# Patient Record
Sex: FEMALE | NOT HISPANIC OR LATINO | Employment: UNEMPLOYED | URBAN - METROPOLITAN AREA
[De-identification: names, ages, dates, MRNs, and addresses within clinical notes are randomized per-mention and may not be internally consistent; named-entity substitution may affect disease eponyms.]

---

## 2023-10-30 ENCOUNTER — OFFICE VISIT (OUTPATIENT)
Dept: FAMILY MEDICINE | Facility: CLINIC | Age: 28
End: 2023-10-30
Payer: COMMERCIAL

## 2023-10-30 ENCOUNTER — HOSPITAL ENCOUNTER (EMERGENCY)
Facility: HOSPITAL | Age: 28
Discharge: HOME | End: 2023-10-30
Admitting: PHYSICIAN ASSISTANT
Payer: COMMERCIAL

## 2023-10-30 VITALS
WEIGHT: 180 LBS | HEIGHT: 64 IN | OXYGEN SATURATION: 99 % | BODY MASS INDEX: 30.73 KG/M2 | TEMPERATURE: 99.7 F | DIASTOLIC BLOOD PRESSURE: 90 MMHG | RESPIRATION RATE: 20 BRPM | HEART RATE: 120 BPM | SYSTOLIC BLOOD PRESSURE: 162 MMHG

## 2023-10-30 VITALS
DIASTOLIC BLOOD PRESSURE: 90 MMHG | HEART RATE: 108 BPM | TEMPERATURE: 100.5 F | WEIGHT: 180 LBS | RESPIRATION RATE: 20 BRPM | OXYGEN SATURATION: 95 % | SYSTOLIC BLOOD PRESSURE: 135 MMHG

## 2023-10-30 DIAGNOSIS — J36 PERITONSILLAR ABSCESS: Primary | ICD-10-CM

## 2023-10-30 DIAGNOSIS — R07.0 THROAT PAIN: ICD-10-CM

## 2023-10-30 LAB
ANION GAP SERPL CALCULATED.3IONS-SCNC: 12 MMOL/L (ref 7–15)
BUN SERPL-MCNC: 6.5 MG/DL (ref 6–20)
CALCIUM SERPL-MCNC: 9.4 MG/DL (ref 8.6–10)
CHLORIDE SERPL-SCNC: 101 MMOL/L (ref 98–107)
CREAT SERPL-MCNC: 0.44 MG/DL (ref 0.51–0.95)
CRP SERPL-MCNC: 99.2 MG/L
DEPRECATED HCO3 PLAS-SCNC: 26 MMOL/L (ref 22–29)
EGFRCR SERPLBLD CKD-EPI 2021: >90 ML/MIN/1.73M2
ERYTHROCYTE [DISTWIDTH] IN BLOOD BY AUTOMATED COUNT: 11.9 % (ref 10–15)
GLUCOSE SERPL-MCNC: 161 MG/DL (ref 70–99)
HCT VFR BLD AUTO: 47 % (ref 35–47)
HGB BLD-MCNC: 15.6 G/DL (ref 11.7–15.7)
MCH RBC QN AUTO: 30.8 PG (ref 26.5–33)
MCHC RBC AUTO-ENTMCNC: 33.2 G/DL (ref 31.5–36.5)
MCV RBC AUTO: 93 FL (ref 78–100)
PLATELET # BLD AUTO: 331 10E3/UL (ref 150–450)
POTASSIUM SERPL-SCNC: 3.8 MMOL/L (ref 3.4–5.3)
RBC # BLD AUTO: 5.07 10E6/UL (ref 3.8–5.2)
SODIUM SERPL-SCNC: 139 MMOL/L (ref 135–145)
WBC # BLD AUTO: 16.9 10E3/UL (ref 4–11)

## 2023-10-30 PROCEDURE — 250N000011 HC RX IP 250 OP 636: Mod: JZ | Performed by: PHYSICIAN ASSISTANT

## 2023-10-30 PROCEDURE — 80048 BASIC METABOLIC PNL TOTAL CA: CPT | Performed by: PHYSICIAN ASSISTANT

## 2023-10-30 PROCEDURE — 96361 HYDRATE IV INFUSION ADD-ON: CPT | Mod: XU

## 2023-10-30 PROCEDURE — 85027 COMPLETE CBC AUTOMATED: CPT | Performed by: PHYSICIAN ASSISTANT

## 2023-10-30 PROCEDURE — 86140 C-REACTIVE PROTEIN: CPT | Performed by: PHYSICIAN ASSISTANT

## 2023-10-30 PROCEDURE — 258N000003 HC RX IP 258 OP 636: Performed by: PHYSICIAN ASSISTANT

## 2023-10-30 PROCEDURE — 99284 EMERGENCY DEPT VISIT MOD MDM: CPT | Mod: 25

## 2023-10-30 PROCEDURE — 96375 TX/PRO/DX INJ NEW DRUG ADDON: CPT | Mod: XU

## 2023-10-30 PROCEDURE — 99203 OFFICE O/P NEW LOW 30 MIN: CPT

## 2023-10-30 PROCEDURE — 42700 I&D ABSCESS PERITONSILLAR: CPT

## 2023-10-30 PROCEDURE — 36415 COLL VENOUS BLD VENIPUNCTURE: CPT | Performed by: PHYSICIAN ASSISTANT

## 2023-10-30 PROCEDURE — 250N000009 HC RX 250: Performed by: PHYSICIAN ASSISTANT

## 2023-10-30 PROCEDURE — 96374 THER/PROPH/DIAG INJ IV PUSH: CPT | Mod: XU

## 2023-10-30 RX ORDER — KETOROLAC TROMETHAMINE 15 MG/ML
15 INJECTION, SOLUTION INTRAMUSCULAR; INTRAVENOUS ONCE
Status: COMPLETED | OUTPATIENT
Start: 2023-10-30 | End: 2023-10-30

## 2023-10-30 RX ORDER — DEXAMETHASONE SODIUM PHOSPHATE 10 MG/ML
10 INJECTION, SOLUTION INTRAMUSCULAR; INTRAVENOUS ONCE
Status: COMPLETED | OUTPATIENT
Start: 2023-10-30 | End: 2023-10-30

## 2023-10-30 RX ORDER — ONDANSETRON 2 MG/ML
4 INJECTION INTRAMUSCULAR; INTRAVENOUS ONCE
Status: COMPLETED | OUTPATIENT
Start: 2023-10-30 | End: 2023-10-30

## 2023-10-30 RX ADMIN — KETOROLAC TROMETHAMINE 15 MG: 15 INJECTION, SOLUTION INTRAMUSCULAR; INTRAVENOUS at 17:24

## 2023-10-30 RX ADMIN — ONDANSETRON 4 MG: 2 INJECTION INTRAMUSCULAR; INTRAVENOUS at 17:24

## 2023-10-30 RX ADMIN — TOPICAL ANESTHETIC 2.5 ML: 200 SPRAY DENTAL; PERIODONTAL at 19:08

## 2023-10-30 RX ADMIN — DEXAMETHASONE SODIUM PHOSPHATE 10 MG: 10 INJECTION, SOLUTION INTRAMUSCULAR; INTRAVENOUS at 17:25

## 2023-10-30 RX ADMIN — SODIUM CHLORIDE 1000 ML: 9 INJECTION, SOLUTION INTRAVENOUS at 17:27

## 2023-10-30 ASSESSMENT — ACTIVITIES OF DAILY LIVING (ADL)
ADLS_ACUITY_SCORE: 35
ADLS_ACUITY_SCORE: 35

## 2023-10-30 NOTE — ED PROVIDER NOTES
EMERGENCY DEPARTMENT ENCOUNTER      NAME: Radha Castanon  AGE: 28 year old female  YOB: 1995  MRN: [id number]  EVALUATION DATE & TIME: 10/30/2023  4:38 PM    PCP: No Ref-Primary, Physician    ED PROVIDER: Anne Flores PA-C      Chief Complaint   Patient presents with    Throat Pain     Left side abscess           FINAL IMPRESSION:  1. Peritonsillar abscess    2. Throat pain          ED COURSE & MEDICAL DECISION MAKIN:45 PM I introduced myself to patient, performed initial HPI and examination.   6:08 PM I talked to the patient about the procedure that will be performed on them.   6:25 PM I performed an incision and drainage procedure on the patient.   7:04 PM I rechecked on the patient and discussed lab and imaging results. Discussed further plan of care and discharge.     28 year old female with no pertinent PMH presents to the Emergency Department for evaluation of left throat pain x 3 days. Reports pain and fullness on the left side, making it difficult to swallow. Fevers today. No history of peritonsillar abscesses previously. No mouth/dental pain. Denies pregnancy.     Differential includes peritonsillar abscess, strep/viral pharyngitis, dental abscess/infection, and others.     Upon arrival: Patient is tachycardic with , Temp 99.7F without antipyretics. HR normalized (97-99) after toradol and IV fluids.   Exam with muffled voice but tolerating secretions, normal phonation, no trismus. Left tonsillar fullness and erythema with deviation of the uvula to the right. Full ROM neck.     Clinically presentation most consistent with peritonsillar abscess. I considered imaging (CT neck soft tissue) but with clinical appearance, tolerating secretions and no respiratory distress I do not think this would change course of treatment as I intend to perform I&D regardless.   Labs obtained primarily for trending, does have leukocytosis (WBC 16.9, BMP unremarkable. CRP markedly elevated (99.20)). I  considered strep testing, however would treat with antibiotics regardless for PTA and thus this would not change clinical course.     Patient was given IV fluids, toradol, and decadron with zofran in the emergency department.    Aspiration was performed, 2.5 mL of purulent drainage removed and patient feels improved after medications and procedures.     Presentation is most consistent with peritonsillar abscess. Will discharge with antibiotics, referral for ENT placed for close follow up ideally in the next few days. Discussed follow up with PCP if ENT cannot see her this week, and given very close ED return precautions. All questions were answered to the best of my ability and patient is agreeable with plan.         Medical Decision Making    History:  Supplemental history from: Documented in chart, if applicable  External Record(s) reviewed: Documented in chart, if applicable.    Work Up:  Chart documentation includes differential considered and any EKGs or imaging independently interpreted by provider.  In additional to work up documented, I considered the following work up: Documented in chart, if applicable.    External consultation:  Discussion of management with another provider: Documented in chart, if applicable    Complicating factors:  Care impacted by chronic illness: N/A  Care affected by social determinants of health: N/A    Disposition considerations: Discharge. I prescribed additional prescription strength medication(s) as charted. See documentation for any additional details.      MEDICATIONS GIVEN IN THE EMERGENCY:  Medications   sodium chloride 0.9% BOLUS 1,000 mL (0 mLs Intravenous Stopped 10/30/23 1903)   ketorolac (TORADOL) injection 15 mg (15 mg Intravenous $Given 10/30/23 1724)   dexAMETHasone (PF) (DECADRON) injectable solution used ORALLY 10 mg (10 mg Oral $Given 10/30/23 1725)   ondansetron (ZOFRAN) injection 4 mg (4 mg Intravenous $Given 10/30/23 1724)   benzocaine 20% (HURRICAINE/TOPEX)  "20 % spray 2.5 mL (2.5 mLs Mouth/Throat $Given by Other 10/30/23 1908)       NEW PRESCRIPTIONS STARTED AT TODAY'S ER VISIT  Discharge Medication List as of 10/30/2023  7:03 PM        START taking these medications    Details   amoxicillin-clavulanate (AUGMENTIN) 875-125 MG tablet Take 1 tablet by mouth 2 times daily for 7 days, Disp-14 tablet, R-0, Local Print                =================================================================    HPI    Patient information was obtained from: Patient and nurse    Use of : N/A        Radha Castanon is a 28 year old female with no pertinent history who presents to this ED by walk in for evaluation of throat pain.     Per nurse reports that the patient began to have left-sided throat pain on Wednesday night and it progressively got worse.     The patient reports that she is having difficulty to swallow and used ibuprofen and Nyquil for her pain yesterday night. She also spiked a fever and has never had any symptoms like this before.     REVIEW OF SYSTEMS   ROS negative unless otherwise stated in HPI    PAST MEDICAL HISTORY:  No past medical history on file.    PAST SURGICAL HISTORY:  No past surgical history on file.    CURRENT MEDICATIONS:    amoxicillin-clavulanate (AUGMENTIN) 875-125 MG tablet        ALLERGIES:  No Known Allergies    FAMILY HISTORY:  No family history on file.    SOCIAL HISTORY:   Social History     Socioeconomic History    Marital status: Patient Declined   Tobacco Use    Smoking status: Never    Smokeless tobacco: Never       VITALS:  BP (!) 162/90   Pulse 120   Temp 99.7  F (37.6  C) (Oral)   Resp 20   Ht 1.626 m (5' 4\")   Wt 81.6 kg (180 lb)   LMP 10/09/2023 (Exact Date)   SpO2 99%   BMI 30.90 kg/m      PHYSICAL EXAM    Constitutional: Well developed, Well nourished, NAD, GCS 15. Muffled voice, tolerating secretions. No tripod-ing.   HENT: Normocephalic, Atraumatic. No trismus. Left tonsillar fullness and erythema with uvular deviation " to the right, right tonsil WNL.   Neck- Supple, Nontender. Normal ROM. No stridor.   Eyes: Conjunctiva normal.   Respiratory: No respiratory distress, speaking in full sentences.   Cardiovascular: Normal heart rate, Regular rhythm, No murmurs.    GI: Soft, nontender.    Musculoskeletal: No deformities, Moves all extremities equally.   Integument: Warm, Dry, No erythema, ecchymosis, or rash.  Neurologic: Alert & oriented x 3, Normal sensory function. No focal deficits.   Psychiatric: Affect normal, Judgment normal, Mood normal. Cooperative.      LAB:  All pertinent labs reviewed and interpreted.  Results for orders placed or performed during the hospital encounter of 10/30/23   CBC (+ platelets, no diff)   Result Value Ref Range    WBC Count 16.9 (H) 4.0 - 11.0 10e3/uL    RBC Count 5.07 3.80 - 5.20 10e6/uL    Hemoglobin 15.6 11.7 - 15.7 g/dL    Hematocrit 47.0 35.0 - 47.0 %    MCV 93 78 - 100 fL    MCH 30.8 26.5 - 33.0 pg    MCHC 33.2 31.5 - 36.5 g/dL    RDW 11.9 10.0 - 15.0 %    Platelet Count 331 150 - 450 10e3/uL   Basic metabolic panel   Result Value Ref Range    Sodium 139 135 - 145 mmol/L    Potassium 3.8 3.4 - 5.3 mmol/L    Chloride 101 98 - 107 mmol/L    Carbon Dioxide (CO2) 26 22 - 29 mmol/L    Anion Gap 12 7 - 15 mmol/L    Urea Nitrogen 6.5 6.0 - 20.0 mg/dL    Creatinine 0.44 (L) 0.51 - 0.95 mg/dL    GFR Estimate >90 >60 mL/min/1.73m2    Calcium 9.4 8.6 - 10.0 mg/dL    Glucose 161 (H) 70 - 99 mg/dL   Result Value Ref Range    CRP Inflammation 99.20 (H) <5.00 mg/L       RADIOLOGY:  Reviewed all pertinent imaging. Please see official radiology report.  No orders to display       EKG:    None    PROCEDURES:   PROCEDURE: Incision and Drainage   INDICATIONS: Localized abscess   PROCEDURE PROVIDER: Anne Flores PA-C   SITE: Left tonsil, PTA   MEDICATION: 1% mLs of 1% Lidocaine without epinephrine   NOTE: Hurricaine spray and then local anesthetic was injected subcutaneously with anesthesia effects demonstrated  prior to proceeding.  The area of maximal fluctuance was aspirated with an 18 gauge needle starting medially and extending outward x 2.5 mL drained out   COMPLEXITY: Simple    Simple = single, furuncle, paronychia, superficial  Complex = multiple or abscess requiring probing, loculations, packing placement   COMPLICATIONS: Patient tolerated procedure well, without complication           I, Ioana Watts, am serving as a scribe to document services personally performed by Anne Flores PA-C based on my observation and the provider's statements to me. I, Anne Flores PA-C, attest that Ioana Watts is acting in a scribe capacity, has observed my performance of the services and has documented them in accordance with my direction.    Anne Flores PA-C  Emergency Medicine  [facility] EMERGENCY DEPARTMENT  [address] [place] [id number]  [phone]             Anne Flores PA-C  10/30/23 2321

## 2023-10-30 NOTE — Clinical Note
Radha Castanon was seen and treated in our emergency department on 10/30/2023.  She may return to work on 11/03/2023.       If you have any questions or concerns, please don't hesitate to call.      Anne Flores PA-C

## 2023-10-30 NOTE — PROGRESS NOTES
URGENT CARE  Assessment & Plan   Assessment:   Radha Castanon is a 28 year old female who's clinical presentation today is consistent with:   1. Peritonsillar abscess  Plan:  Patient has a peritonsillar abscess on exam, sending patient to ED for higher level of care. Noted trismus, dysphonia,  and unilateral left sided  edema of peritonsillar region causing uvular deviation   Patient is agreeable and states she can get there.     TIA [facility]      ______________________________________________________________________      Subjective     HPI: Radha Castanon  is a 28 year old  female who presents today for evaluation the following concerns:   Patient  endorses a sore throat today which started 5 days ago   Patient endorses difficulty opening jaw, dysphonia/voice changes  Patient also endorses a new fever, feelings of malaise and chills   Patient states she was at [facility] urgent care today and they did a rapid strep which they said was negative     Review of Systems:  Pertinent review of systems as reflected in HPI, otherwise negative.     Objective    Physical Exam:  Vitals:    10/30/23 1605   BP: (!) 135/90   Pulse: 108   Resp: 20   Temp: (!) 100.5  F (38.1  C)   TempSrc: Oral   SpO2: 95%   Weight: 81.6 kg (180 lb)      General:   alert and oriented, acute distress, ill-appearing, tearful   Vital signs reviewed: fever noted   MOUTH/THROAT: lips, tongue, & oral mucosa appear normal upon inspection                Posterior oropharynx is erythematous with exudate,  Noted unilateral tonsillar edema with left sided edema causing uvular deviation   Dysphonia heard, consistent with a peritonsillar abscess    ______________________________________________________________________    I explained my diagnostic considerations and recommendations to the patient, who voiced understanding and agreement with the treatment plan.   All questions were answered.   We discussed potential side effects, risks  and benefits of any prescribed or recommended therapies, as well as expectations for response to treatments.  Please see AVS for any patient instructions & handouts given.   Patient was advised to contact the Nurse Care Line, their Primary Care provider, Urgent Care, or the Emergency Department if there are new or worsening symptoms, or call 911 for emergencies.

## 2023-10-30 NOTE — ED TRIAGE NOTES
Patient c/o left sided throat pain that started Wednesday night that has progressively gotten worse.  Denies shortness of breath.  Still has tonsils.  Has hoarse voice.      Triage Assessment (Adult)       Row Name 10/30/23 2978          Triage Assessment    Airway WDL WDL        Respiratory WDL    Respiratory WDL WDL        Skin Circulation/Temperature WDL    Skin Circulation/Temperature WDL WDL        Cardiac WDL    Cardiac WDL WDL        Peripheral/Neurovascular WDL    Peripheral Neurovascular WDL WDL        Cognitive/Neuro/Behavioral WDL    Cognitive/Neuro/Behavioral WDL WDL

## 2023-10-31 ENCOUNTER — HOSPITAL ENCOUNTER (INPATIENT)
Facility: HOSPITAL | Age: 28
LOS: 1 days | Discharge: HOME | End: 2023-11-02
Attending: EMERGENCY MEDICINE | Admitting: INTERNAL MEDICINE
Payer: COMMERCIAL

## 2023-10-31 ENCOUNTER — APPOINTMENT (OUTPATIENT)
Dept: CT IMAGING | Facility: HOSPITAL | Age: 28
End: 2023-10-31
Attending: EMERGENCY MEDICINE
Payer: COMMERCIAL

## 2023-10-31 ENCOUNTER — TELEPHONE (OUTPATIENT)
Dept: OTOLARYNGOLOGY | Facility: CLINIC | Age: 28
End: 2023-10-31
Payer: COMMERCIAL

## 2023-10-31 DIAGNOSIS — J36 PERITONSILLAR ABSCESS: ICD-10-CM

## 2023-10-31 LAB
ALBUMIN SERPL BCG-MCNC: 4 G/DL (ref 3.5–5.2)
ALP SERPL-CCNC: 92 U/L (ref 35–104)
ALT SERPL W P-5'-P-CCNC: 37 U/L (ref 0–50)
ANION GAP SERPL CALCULATED.3IONS-SCNC: 15 MMOL/L (ref 7–15)
AST SERPL W P-5'-P-CCNC: 22 U/L (ref 0–45)
BASOPHILS # BLD AUTO: 0 10E3/UL (ref 0–0.2)
BASOPHILS NFR BLD AUTO: 0 %
BILIRUB SERPL-MCNC: 0.2 MG/DL
BUN SERPL-MCNC: 11.7 MG/DL (ref 6–20)
CALCIUM SERPL-MCNC: 8.6 MG/DL (ref 8.6–10)
CHLORIDE SERPL-SCNC: 103 MMOL/L (ref 98–107)
CREAT SERPL-MCNC: 0.57 MG/DL (ref 0.51–0.95)
DEPRECATED HCO3 PLAS-SCNC: 24 MMOL/L (ref 22–29)
EGFRCR SERPLBLD CKD-EPI 2021: >90 ML/MIN/1.73M2
EOSINOPHIL # BLD AUTO: 0 10E3/UL (ref 0–0.7)
EOSINOPHIL NFR BLD AUTO: 0 %
ERYTHROCYTE [DISTWIDTH] IN BLOOD BY AUTOMATED COUNT: 11.8 % (ref 10–15)
GLUCOSE SERPL-MCNC: 270 MG/DL (ref 70–99)
HCG SERPL QL: NEGATIVE
HCT VFR BLD AUTO: 40.8 % (ref 35–47)
HGB BLD-MCNC: 13.3 G/DL (ref 11.7–15.7)
IMM GRANULOCYTES # BLD: 0.1 10E3/UL
IMM GRANULOCYTES NFR BLD: 1 %
LACTATE SERPL-SCNC: 2 MMOL/L (ref 0.7–2)
LYMPHOCYTES # BLD AUTO: 2.8 10E3/UL (ref 0.8–5.3)
LYMPHOCYTES NFR BLD AUTO: 14 %
MCH RBC QN AUTO: 30.4 PG (ref 26.5–33)
MCHC RBC AUTO-ENTMCNC: 32.6 G/DL (ref 31.5–36.5)
MCV RBC AUTO: 93 FL (ref 78–100)
MONOCYTES # BLD AUTO: 1.2 10E3/UL (ref 0–1.3)
MONOCYTES NFR BLD AUTO: 6 %
NEUTROPHILS # BLD AUTO: 15.6 10E3/UL (ref 1.6–8.3)
NEUTROPHILS NFR BLD AUTO: 79 %
NRBC # BLD AUTO: 0 10E3/UL
NRBC BLD AUTO-RTO: 0 /100
PLATELET # BLD AUTO: 379 10E3/UL (ref 150–450)
POTASSIUM SERPL-SCNC: 3.7 MMOL/L (ref 3.4–5.3)
PROT SERPL-MCNC: 8 G/DL (ref 6.4–8.3)
RBC # BLD AUTO: 4.38 10E6/UL (ref 3.8–5.2)
SODIUM SERPL-SCNC: 142 MMOL/L (ref 135–145)
WBC # BLD AUTO: 19.8 10E3/UL (ref 4–11)

## 2023-10-31 PROCEDURE — 250N000011 HC RX IP 250 OP 636: Mod: JZ | Performed by: EMERGENCY MEDICINE

## 2023-10-31 PROCEDURE — 85025 COMPLETE CBC W/AUTO DIFF WBC: CPT | Performed by: EMERGENCY MEDICINE

## 2023-10-31 PROCEDURE — 99285 EMERGENCY DEPT VISIT HI MDM: CPT | Mod: 25

## 2023-10-31 PROCEDURE — 80053 COMPREHEN METABOLIC PANEL: CPT | Performed by: EMERGENCY MEDICINE

## 2023-10-31 PROCEDURE — 70491 CT SOFT TISSUE NECK W/DYE: CPT

## 2023-10-31 PROCEDURE — 96365 THER/PROPH/DIAG IV INF INIT: CPT

## 2023-10-31 PROCEDURE — 96361 HYDRATE IV INFUSION ADD-ON: CPT

## 2023-10-31 PROCEDURE — 36415 COLL VENOUS BLD VENIPUNCTURE: CPT | Performed by: EMERGENCY MEDICINE

## 2023-10-31 PROCEDURE — 96375 TX/PRO/DX INJ NEW DRUG ADDON: CPT

## 2023-10-31 PROCEDURE — 84703 CHORIONIC GONADOTROPIN ASSAY: CPT | Performed by: EMERGENCY MEDICINE

## 2023-10-31 PROCEDURE — 258N000003 HC RX IP 258 OP 636: Performed by: EMERGENCY MEDICINE

## 2023-10-31 PROCEDURE — 83605 ASSAY OF LACTIC ACID: CPT | Performed by: EMERGENCY MEDICINE

## 2023-10-31 RX ORDER — AMPICILLIN AND SULBACTAM 2; 1 G/1; G/1
3 INJECTION, POWDER, FOR SOLUTION INTRAMUSCULAR; INTRAVENOUS ONCE
Status: COMPLETED | OUTPATIENT
Start: 2023-10-31 | End: 2023-11-01

## 2023-10-31 RX ORDER — IOPAMIDOL 755 MG/ML
75 INJECTION, SOLUTION INTRAVASCULAR ONCE
Status: COMPLETED | OUTPATIENT
Start: 2023-10-31 | End: 2023-10-31

## 2023-10-31 RX ORDER — KETOROLAC TROMETHAMINE 15 MG/ML
15 INJECTION, SOLUTION INTRAMUSCULAR; INTRAVENOUS ONCE
Status: COMPLETED | OUTPATIENT
Start: 2023-10-31 | End: 2023-10-31

## 2023-10-31 RX ADMIN — IOPAMIDOL 75 ML: 755 INJECTION, SOLUTION INTRAVENOUS at 23:00

## 2023-10-31 RX ADMIN — AMPICILLIN SODIUM AND SULBACTAM SODIUM 3 G: 2; 1 INJECTION, POWDER, FOR SOLUTION INTRAMUSCULAR; INTRAVENOUS at 23:38

## 2023-10-31 RX ADMIN — SODIUM CHLORIDE 1000 ML: 9 INJECTION, SOLUTION INTRAVENOUS at 21:56

## 2023-10-31 RX ADMIN — KETOROLAC TROMETHAMINE 15 MG: 15 INJECTION, SOLUTION INTRAMUSCULAR; INTRAVENOUS at 22:05

## 2023-10-31 ASSESSMENT — ENCOUNTER SYMPTOMS
ABDOMINAL PAIN: 0
CHILLS: 1
VOMITING: 0
DIARRHEA: 0
NAUSEA: 0
FEVER: 0
SHORTNESS OF BREATH: 0
TROUBLE SWALLOWING: 1
SORE THROAT: 1
RHINORRHEA: 0

## 2023-10-31 ASSESSMENT — ACTIVITIES OF DAILY LIVING (ADL): ADLS_ACUITY_SCORE: 35

## 2023-10-31 NOTE — TELEPHONE ENCOUNTER
[facility] Call Center    Phone Message    May a detailed message be left on voicemail: yes     Reason for Call: Other: Pt called to schedule an appt from referral that was sent for peritonsillar abscess. Sending [facility] TE per protocols     Action Taken: Other: [facility]    Travel Screening: Not Applicable

## 2023-10-31 NOTE — TELEPHONE ENCOUNTER
This writer called patient to discuss a follow up ENT appointment.    Patient was advised that we don't have any open appointments this week for patient to do a follow up in ENT (dorinda Gomez RN). Patient was advised that she should see her primary care provider for any ongoing issues and to follow up with the ENT clinic if symptoms don't improve and that we would work to get her seen at that time.    Patient verbalizes understanding of this plan and is in agreement. Patient has no further questions at this time.    MYKEL SANABRIA LPN on 10/31/2023 at 11:11 AM

## 2023-10-31 NOTE — DISCHARGE INSTRUCTIONS
Use tylenol and ibuprofen as needed for pain.  Make sure you are drinking plenty of fluids to stay hydrated.  Start the antibiotics as soon as you leave the emergency department: Augmentin 2 times daily for 7 days.    I have placed a referral for ENT and would like you to be seen in the next 2 days. If there are delays and they cannot see you this week, follow up with Primary care in 2 days.    Return to the emergency department if you develop new fevers, worsening throat pain, difficulty swallowing/breathing, or any other concerning symptoms. We would be happy to see you.

## 2023-11-01 ENCOUNTER — ANESTHESIA EVENT (OUTPATIENT)
Dept: SURGERY | Facility: HOSPITAL | Age: 28
End: 2023-11-01
Payer: COMMERCIAL

## 2023-11-01 ENCOUNTER — ANESTHESIA (OUTPATIENT)
Dept: SURGERY | Facility: HOSPITAL | Age: 28
End: 2023-11-01
Payer: COMMERCIAL

## 2023-11-01 PROBLEM — K65.1 PERITONEAL ABSCESS (H): Status: ACTIVE | Noted: 2023-11-01

## 2023-11-01 PROBLEM — J36 PERITONSILLAR ABSCESS: Status: ACTIVE | Noted: 2023-11-01

## 2023-11-01 LAB
ALBUMIN SERPL BCG-MCNC: 3.6 G/DL (ref 3.5–5.2)
ALP SERPL-CCNC: 85 U/L (ref 35–104)
ALT SERPL W P-5'-P-CCNC: 32 U/L (ref 0–50)
ANION GAP SERPL CALCULATED.3IONS-SCNC: 12 MMOL/L (ref 7–15)
AST SERPL W P-5'-P-CCNC: 21 U/L (ref 0–45)
BASOPHILS # BLD AUTO: 0 10E3/UL (ref 0–0.2)
BASOPHILS NFR BLD AUTO: 0 %
BILIRUB SERPL-MCNC: 0.2 MG/DL
BUN SERPL-MCNC: 9.7 MG/DL (ref 6–20)
CALCIUM SERPL-MCNC: 8 MG/DL (ref 8.6–10)
CHLORIDE SERPL-SCNC: 106 MMOL/L (ref 98–107)
CREAT SERPL-MCNC: 0.44 MG/DL (ref 0.51–0.95)
DEPRECATED HCO3 PLAS-SCNC: 23 MMOL/L (ref 22–29)
EGFRCR SERPLBLD CKD-EPI 2021: >90 ML/MIN/1.73M2
EOSINOPHIL # BLD AUTO: 0 10E3/UL (ref 0–0.7)
EOSINOPHIL NFR BLD AUTO: 0 %
ERYTHROCYTE [DISTWIDTH] IN BLOOD BY AUTOMATED COUNT: 11.9 % (ref 10–15)
GLUCOSE BLDC GLUCOMTR-MCNC: 216 MG/DL (ref 70–99)
GLUCOSE BLDC GLUCOMTR-MCNC: 224 MG/DL (ref 70–99)
GLUCOSE BLDC GLUCOMTR-MCNC: 244 MG/DL (ref 70–99)
GLUCOSE BLDC GLUCOMTR-MCNC: 283 MG/DL (ref 70–99)
GLUCOSE SERPL-MCNC: 289 MG/DL (ref 70–99)
HCT VFR BLD AUTO: 38.6 % (ref 35–47)
HGB BLD-MCNC: 12.5 G/DL (ref 11.7–15.7)
IMM GRANULOCYTES # BLD: 0.2 10E3/UL
IMM GRANULOCYTES NFR BLD: 1 %
LYMPHOCYTES # BLD AUTO: 1.2 10E3/UL (ref 0.8–5.3)
LYMPHOCYTES NFR BLD AUTO: 9 %
MCH RBC QN AUTO: 30.3 PG (ref 26.5–33)
MCHC RBC AUTO-ENTMCNC: 32.4 G/DL (ref 31.5–36.5)
MCV RBC AUTO: 94 FL (ref 78–100)
MONOCYTES # BLD AUTO: 0.2 10E3/UL (ref 0–1.3)
MONOCYTES NFR BLD AUTO: 1 %
NEUTROPHILS # BLD AUTO: 11.8 10E3/UL (ref 1.6–8.3)
NEUTROPHILS NFR BLD AUTO: 89 %
NRBC # BLD AUTO: 0 10E3/UL
NRBC BLD AUTO-RTO: 0 /100
PLATELET # BLD AUTO: 331 10E3/UL (ref 150–450)
POTASSIUM SERPL-SCNC: 4.2 MMOL/L (ref 3.4–5.3)
PROT SERPL-MCNC: 7.5 G/DL (ref 6.4–8.3)
RBC # BLD AUTO: 4.13 10E6/UL (ref 3.8–5.2)
SODIUM SERPL-SCNC: 141 MMOL/L (ref 135–145)
WBC # BLD AUTO: 13.4 10E3/UL (ref 4–11)

## 2023-11-01 PROCEDURE — 80053 COMPREHEN METABOLIC PANEL: CPT | Performed by: INTERNAL MEDICINE

## 2023-11-01 PROCEDURE — 87075 CULTR BACTERIA EXCEPT BLOOD: CPT | Performed by: OTOLARYNGOLOGY

## 2023-11-01 PROCEDURE — 250N000009 HC RX 250: Performed by: NURSE ANESTHETIST, CERTIFIED REGISTERED

## 2023-11-01 PROCEDURE — 36415 COLL VENOUS BLD VENIPUNCTURE: CPT | Performed by: INTERNAL MEDICINE

## 2023-11-01 PROCEDURE — 99207 PR NO BILLABLE SERVICE THIS VISIT: CPT | Performed by: INTERNAL MEDICINE

## 2023-11-01 PROCEDURE — 999N000141 HC STATISTIC PRE-PROCEDURE NURSING ASSESSMENT: Performed by: OTOLARYNGOLOGY

## 2023-11-01 PROCEDURE — 258N000003 HC RX IP 258 OP 636: Performed by: NURSE ANESTHETIST, CERTIFIED REGISTERED

## 2023-11-01 PROCEDURE — 258N000003 HC RX IP 258 OP 636: Performed by: INTERNAL MEDICINE

## 2023-11-01 PROCEDURE — 250N000009 HC RX 250: Performed by: OTOLARYNGOLOGY

## 2023-11-01 PROCEDURE — 82962 GLUCOSE BLOOD TEST: CPT

## 2023-11-01 PROCEDURE — 96375 TX/PRO/DX INJ NEW DRUG ADDON: CPT

## 2023-11-01 PROCEDURE — 99222 1ST HOSP IP/OBS MODERATE 55: CPT | Performed by: INTERNAL MEDICINE

## 2023-11-01 PROCEDURE — 42700 I&D ABSCESS PERITONSILLAR: CPT | Performed by: OTOLARYNGOLOGY

## 2023-11-01 PROCEDURE — 120N000001 HC R&B MED SURG/OB

## 2023-11-01 PROCEDURE — 99253 IP/OBS CNSLTJ NEW/EST LOW 45: CPT | Mod: 25 | Performed by: OTOLARYNGOLOGY

## 2023-11-01 PROCEDURE — 0C9P3ZZ DRAINAGE OF TONSILS, PERCUTANEOUS APPROACH: ICD-10-PCS | Performed by: OTOLARYNGOLOGY

## 2023-11-01 PROCEDURE — 250N000012 HC RX MED GY IP 250 OP 636 PS 637: Performed by: INTERNAL MEDICINE

## 2023-11-01 PROCEDURE — 250N000011 HC RX IP 250 OP 636: Performed by: INTERNAL MEDICINE

## 2023-11-01 PROCEDURE — 250N000011 HC RX IP 250 OP 636: Mod: JZ | Performed by: NURSE ANESTHETIST, CERTIFIED REGISTERED

## 2023-11-01 PROCEDURE — 360N000075 HC SURGERY LEVEL 2, PER MIN: Performed by: OTOLARYNGOLOGY

## 2023-11-01 PROCEDURE — 250N000011 HC RX IP 250 OP 636: Mod: JZ | Performed by: EMERGENCY MEDICINE

## 2023-11-01 PROCEDURE — 85025 COMPLETE CBC W/AUTO DIFF WBC: CPT | Performed by: INTERNAL MEDICINE

## 2023-11-01 PROCEDURE — 250N000013 HC RX MED GY IP 250 OP 250 PS 637: Performed by: INTERNAL MEDICINE

## 2023-11-01 PROCEDURE — 710N000009 HC RECOVERY PHASE 1, LEVEL 1, PER MIN: Performed by: OTOLARYNGOLOGY

## 2023-11-01 PROCEDURE — 250N000011 HC RX IP 250 OP 636: Performed by: ANESTHESIOLOGY

## 2023-11-01 PROCEDURE — 272N000001 HC OR GENERAL SUPPLY STERILE: Performed by: OTOLARYNGOLOGY

## 2023-11-01 PROCEDURE — 87077 CULTURE AEROBIC IDENTIFY: CPT | Performed by: OTOLARYNGOLOGY

## 2023-11-01 PROCEDURE — 370N000017 HC ANESTHESIA TECHNICAL FEE, PER MIN: Performed by: OTOLARYNGOLOGY

## 2023-11-01 RX ORDER — ONDANSETRON 2 MG/ML
4 INJECTION INTRAMUSCULAR; INTRAVENOUS EVERY 30 MIN PRN
Status: DISCONTINUED | OUTPATIENT
Start: 2023-11-01 | End: 2023-11-01 | Stop reason: HOSPADM

## 2023-11-01 RX ORDER — DEXTROSE MONOHYDRATE 25 G/50ML
25-50 INJECTION, SOLUTION INTRAVENOUS
Status: DISCONTINUED | OUTPATIENT
Start: 2023-11-01 | End: 2023-11-01

## 2023-11-01 RX ORDER — DEXAMETHASONE SODIUM PHOSPHATE 10 MG/ML
10 INJECTION, SOLUTION INTRAMUSCULAR; INTRAVENOUS ONCE
Status: COMPLETED | OUTPATIENT
Start: 2023-11-01 | End: 2023-11-01

## 2023-11-01 RX ORDER — PROPOFOL 10 MG/ML
INJECTION, EMULSION INTRAVENOUS PRN
Status: DISCONTINUED | OUTPATIENT
Start: 2023-11-01 | End: 2023-11-01

## 2023-11-01 RX ORDER — NALOXONE HYDROCHLORIDE 0.4 MG/ML
0.2 INJECTION, SOLUTION INTRAMUSCULAR; INTRAVENOUS; SUBCUTANEOUS
Status: DISCONTINUED | OUTPATIENT
Start: 2023-11-01 | End: 2023-11-02 | Stop reason: HOSPADM

## 2023-11-01 RX ORDER — FENTANYL CITRATE 50 UG/ML
25 INJECTION, SOLUTION INTRAMUSCULAR; INTRAVENOUS EVERY 5 MIN PRN
Status: DISCONTINUED | OUTPATIENT
Start: 2023-11-01 | End: 2023-11-01 | Stop reason: HOSPADM

## 2023-11-01 RX ORDER — DEXTROSE MONOHYDRATE 25 G/50ML
25-50 INJECTION, SOLUTION INTRAVENOUS
Status: DISCONTINUED | OUTPATIENT
Start: 2023-11-01 | End: 2023-11-02 | Stop reason: HOSPADM

## 2023-11-01 RX ORDER — SODIUM CHLORIDE, SODIUM LACTATE, POTASSIUM CHLORIDE, CALCIUM CHLORIDE 600; 310; 30; 20 MG/100ML; MG/100ML; MG/100ML; MG/100ML
INJECTION, SOLUTION INTRAVENOUS CONTINUOUS PRN
Status: DISCONTINUED | OUTPATIENT
Start: 2023-11-01 | End: 2023-11-01

## 2023-11-01 RX ORDER — ACETAMINOPHEN 325 MG/1
650 TABLET ORAL EVERY 4 HOURS PRN
Status: DISCONTINUED | OUTPATIENT
Start: 2023-11-01 | End: 2023-11-02 | Stop reason: HOSPADM

## 2023-11-01 RX ORDER — ONDANSETRON 2 MG/ML
INJECTION INTRAMUSCULAR; INTRAVENOUS PRN
Status: DISCONTINUED | OUTPATIENT
Start: 2023-11-01 | End: 2023-11-01

## 2023-11-01 RX ORDER — NICOTINE POLACRILEX 4 MG
15-30 LOZENGE BUCCAL
Status: DISCONTINUED | OUTPATIENT
Start: 2023-11-01 | End: 2023-11-01

## 2023-11-01 RX ORDER — PROPOFOL 10 MG/ML
INJECTION, EMULSION INTRAVENOUS CONTINUOUS PRN
Status: DISCONTINUED | OUTPATIENT
Start: 2023-11-01 | End: 2023-11-01

## 2023-11-01 RX ORDER — LIDOCAINE HYDROCHLORIDE 10 MG/ML
INJECTION, SOLUTION INFILTRATION; PERINEURAL PRN
Status: DISCONTINUED | OUTPATIENT
Start: 2023-11-01 | End: 2023-11-01

## 2023-11-01 RX ORDER — OXYCODONE HCL 5 MG/5 ML
0.07 SOLUTION, ORAL ORAL EVERY 6 HOURS PRN
Qty: 75 ML | Refills: 0 | Status: SHIPPED | OUTPATIENT
Start: 2023-11-01

## 2023-11-01 RX ORDER — DEXAMETHASONE SODIUM PHOSPHATE 10 MG/ML
INJECTION, SOLUTION INTRAMUSCULAR; INTRAVENOUS PRN
Status: DISCONTINUED | OUTPATIENT
Start: 2023-11-01 | End: 2023-11-01

## 2023-11-01 RX ORDER — NALOXONE HYDROCHLORIDE 0.4 MG/ML
0.4 INJECTION, SOLUTION INTRAMUSCULAR; INTRAVENOUS; SUBCUTANEOUS
Status: DISCONTINUED | OUTPATIENT
Start: 2023-11-01 | End: 2023-11-02 | Stop reason: HOSPADM

## 2023-11-01 RX ORDER — FENTANYL CITRATE 50 UG/ML
INJECTION, SOLUTION INTRAMUSCULAR; INTRAVENOUS PRN
Status: DISCONTINUED | OUTPATIENT
Start: 2023-11-01 | End: 2023-11-01

## 2023-11-01 RX ORDER — LIDOCAINE HYDROCHLORIDE AND EPINEPHRINE 10; 10 MG/ML; UG/ML
INJECTION, SOLUTION INFILTRATION; PERINEURAL PRN
Status: DISCONTINUED | OUTPATIENT
Start: 2023-11-01 | End: 2023-11-01 | Stop reason: HOSPADM

## 2023-11-01 RX ORDER — FENTANYL CITRATE 50 UG/ML
50 INJECTION, SOLUTION INTRAMUSCULAR; INTRAVENOUS EVERY 5 MIN PRN
Status: DISCONTINUED | OUTPATIENT
Start: 2023-11-01 | End: 2023-11-01 | Stop reason: HOSPADM

## 2023-11-01 RX ORDER — LIDOCAINE 40 MG/G
CREAM TOPICAL
Status: DISCONTINUED | OUTPATIENT
Start: 2023-11-01 | End: 2023-11-01 | Stop reason: HOSPADM

## 2023-11-01 RX ORDER — NICOTINE POLACRILEX 4 MG
15-30 LOZENGE BUCCAL
Status: DISCONTINUED | OUTPATIENT
Start: 2023-11-01 | End: 2023-11-02 | Stop reason: HOSPADM

## 2023-11-01 RX ORDER — SODIUM CHLORIDE, SODIUM LACTATE, POTASSIUM CHLORIDE, CALCIUM CHLORIDE 600; 310; 30; 20 MG/100ML; MG/100ML; MG/100ML; MG/100ML
INJECTION, SOLUTION INTRAVENOUS CONTINUOUS
Status: DISCONTINUED | OUTPATIENT
Start: 2023-11-01 | End: 2023-11-01 | Stop reason: HOSPADM

## 2023-11-01 RX ORDER — METHYLPREDNISOLONE 4 MG
TABLET, DOSE PACK ORAL
Qty: 21 TABLET | Refills: 0 | Status: SHIPPED | OUTPATIENT
Start: 2023-11-02 | End: 2023-11-02

## 2023-11-01 RX ORDER — ONDANSETRON 4 MG/1
4 TABLET, ORALLY DISINTEGRATING ORAL EVERY 30 MIN PRN
Status: DISCONTINUED | OUTPATIENT
Start: 2023-11-01 | End: 2023-11-01 | Stop reason: HOSPADM

## 2023-11-01 RX ORDER — HYDROMORPHONE HYDROCHLORIDE 1 MG/ML
0.2 INJECTION, SOLUTION INTRAMUSCULAR; INTRAVENOUS; SUBCUTANEOUS EVERY 5 MIN PRN
Status: DISCONTINUED | OUTPATIENT
Start: 2023-11-01 | End: 2023-11-01 | Stop reason: HOSPADM

## 2023-11-01 RX ORDER — AMPICILLIN AND SULBACTAM 2; 1 G/1; G/1
3 INJECTION, POWDER, FOR SOLUTION INTRAMUSCULAR; INTRAVENOUS EVERY 6 HOURS
Status: DISCONTINUED | OUTPATIENT
Start: 2023-11-01 | End: 2023-11-02 | Stop reason: HOSPADM

## 2023-11-01 RX ORDER — HYDROMORPHONE HYDROCHLORIDE 1 MG/ML
0.4 INJECTION, SOLUTION INTRAMUSCULAR; INTRAVENOUS; SUBCUTANEOUS EVERY 5 MIN PRN
Status: DISCONTINUED | OUTPATIENT
Start: 2023-11-01 | End: 2023-11-01 | Stop reason: HOSPADM

## 2023-11-01 RX ADMIN — DEXTROSE AND SODIUM CHLORIDE: 5; 900 INJECTION, SOLUTION INTRAVENOUS at 01:54

## 2023-11-01 RX ADMIN — DEXAMETHASONE SODIUM PHOSPHATE 10 MG: 10 INJECTION, SOLUTION INTRAMUSCULAR; INTRAVENOUS at 00:12

## 2023-11-01 RX ADMIN — ONDANSETRON 4 MG: 2 INJECTION INTRAMUSCULAR; INTRAVENOUS at 16:41

## 2023-11-01 RX ADMIN — PROPOFOL 175 MCG/KG/MIN: 10 INJECTION, EMULSION INTRAVENOUS at 16:30

## 2023-11-01 RX ADMIN — PROPOFOL 200 MG: 10 INJECTION, EMULSION INTRAVENOUS at 16:29

## 2023-11-01 RX ADMIN — INSULIN ASPART 1 UNITS: 100 INJECTION, SOLUTION INTRAVENOUS; SUBCUTANEOUS at 19:16

## 2023-11-01 RX ADMIN — LIDOCAINE HYDROCHLORIDE 10 MG: 10 INJECTION, SOLUTION INFILTRATION; PERINEURAL at 16:40

## 2023-11-01 RX ADMIN — AMPICILLIN SODIUM AND SULBACTAM SODIUM 3 G: 2; 1 INJECTION, POWDER, FOR SOLUTION INTRAMUSCULAR; INTRAVENOUS at 12:14

## 2023-11-01 RX ADMIN — ACETAMINOPHEN 650 MG: 325 TABLET ORAL at 09:34

## 2023-11-01 RX ADMIN — ROCURONIUM BROMIDE 50 MG: 50 INJECTION, SOLUTION INTRAVENOUS at 16:29

## 2023-11-01 RX ADMIN — SODIUM CHLORIDE, POTASSIUM CHLORIDE, SODIUM LACTATE AND CALCIUM CHLORIDE: 600; 310; 30; 20 INJECTION, SOLUTION INTRAVENOUS at 16:22

## 2023-11-01 RX ADMIN — SUGAMMADEX 200 MG: 100 INJECTION, SOLUTION INTRAVENOUS at 17:08

## 2023-11-01 RX ADMIN — INSULIN ASPART 2 UNITS: 100 INJECTION, SOLUTION INTRAVENOUS; SUBCUTANEOUS at 07:57

## 2023-11-01 RX ADMIN — DEXTROSE AND SODIUM CHLORIDE: 5; 900 INJECTION, SOLUTION INTRAVENOUS at 13:04

## 2023-11-01 RX ADMIN — AMPICILLIN SODIUM AND SULBACTAM SODIUM 3 G: 2; 1 INJECTION, POWDER, FOR SOLUTION INTRAMUSCULAR; INTRAVENOUS at 19:26

## 2023-11-01 RX ADMIN — LIDOCAINE HYDROCHLORIDE 40 MG: 10 INJECTION, SOLUTION INFILTRATION; PERINEURAL at 16:28

## 2023-11-01 RX ADMIN — INSULIN ASPART 1 UNITS: 100 INJECTION, SOLUTION INTRAVENOUS; SUBCUTANEOUS at 11:56

## 2023-11-01 RX ADMIN — INSULIN ASPART 2 UNITS: 100 INJECTION, SOLUTION INTRAVENOUS; SUBCUTANEOUS at 03:37

## 2023-11-01 RX ADMIN — FENTANYL CITRATE 50 MCG: 50 INJECTION, SOLUTION INTRAMUSCULAR; INTRAVENOUS at 17:46

## 2023-11-01 RX ADMIN — AMPICILLIN SODIUM AND SULBACTAM SODIUM 3 G: 2; 1 INJECTION, POWDER, FOR SOLUTION INTRAMUSCULAR; INTRAVENOUS at 05:22

## 2023-11-01 RX ADMIN — DEXAMETHASONE SODIUM PHOSPHATE 10 MG: 10 INJECTION, SOLUTION INTRAMUSCULAR; INTRAVENOUS at 16:40

## 2023-11-01 RX ADMIN — FENTANYL CITRATE 100 MCG: 50 INJECTION INTRAMUSCULAR; INTRAVENOUS at 16:29

## 2023-11-01 RX ADMIN — FENTANYL CITRATE 50 MCG: 50 INJECTION, SOLUTION INTRAMUSCULAR; INTRAVENOUS at 17:36

## 2023-11-01 ASSESSMENT — ACTIVITIES OF DAILY LIVING (ADL)
ADLS_ACUITY_SCORE: 35
ADLS_ACUITY_SCORE: 20
ADLS_ACUITY_SCORE: 35

## 2023-11-01 NOTE — PROGRESS NOTES
Patient seen by Dr. Brown this morning.  Chart reviewed.  Patient denies pain or fever.  Awaiting I&D of the left tonsillar abscess.  Discussed with patient and nurse.  Ordered Tylenol for pain control.  Continue antibiotics and IV fluids

## 2023-11-01 NOTE — DISCHARGE INSTRUCTIONS
"  Medrol dosepack as directed (with food)  Roxicet as directed (take 1/2 dose first time)  Manuka honey 1 teaspoon diluted in warm water 3x daily for 10 days  Soft diet (no foods with sharp edges) for 14 days  It may be helpful to sleep with your head elevated with several pillows for the first 3 nights to help with post operative swelling      MANUKA HONEY FOR POST TONSIL HEALING    Manuka honey is native to [place]     Unlike traditionalhoney. Manuka honey has antibacterial activity    It can reduce pain after tonsillectomy and speeds up wound healing.    1 teaspoon of manuka honey 2-3 times a day can help after tonsil surgery    It is best taken directly from the spoon but if needed you can mix itin plain or lukewarm water. Avoid hot water.    UMF or unique manuka factor rating is a score given to manuka honey based on methylglyoxal content.     Effective manuka honey should have UMF rating of 10 or above.    Alternatively, you can use Manuka \"soothing pops\" available on [employer] or at [employer]   "

## 2023-11-01 NOTE — H&P
"[facility]    History and Physical - Hospitalist Service       Date of Admission:  10/31/2023    Assessment & Plan      Patient is a 28 year old without much medical history who is being admitted wih a peritonlar abscess  that has has  failed outpatient treatment.    Patient Active Problem List   Diagnosis    Peritoneal abscess (H)    Peritonsillar abscess      Peritonsillar abscess -failed outpatient treatment with Augmentin.  Patient started on Unasyn, IV Decadron.  Pain control for now  ENT consulted for further surgical intervention  Make n.p.o.  D5 saline running        Diet:  NPO.  D5 saline  DVT Prophylaxis: Pneumatic Compression Devices  Sierra Catheter: Not present  Lines: None     Cardiac Monitoring: None  Code Status:  Full code    Clinically Significant Risk Factors Present on Admission                       # Obesity: Estimated body mass index is 30.9 kg/m  as calculated from the following:    Height as of 10/30/23: 1.626 m (5' 4\").    Weight as of 10/30/23: 81.6 kg (180 lb).              Disposition Plan           Leela Brown MD  Hospitalist Service  [facility]  Securely message with [employer] (more info)  Text page via [facility] Paging/Directory     ______________________________________________________________________    Chief Complaint   Peritonsillar abscess       History of Present Illness   Patient is a 28 year old without much medical history who is being admitted wih a peritonlar abscess  that has has  failed outpatient treatment.    Patient was seen in the ER on admission.  She was awake, alert, oriented to place person and time and could provide a history.  Per report, She was seen 10/30, drained, sent home on agumentin.       Patient presented with with worsening s swelling and increasing pain. Preliminary work-up done in the ER showed a BMP which was unremarkable.  CRP was 99.2 10/30/2023.  White count worsened from 16-19,000 from " 10/30/2023.    Narrative & Impression   EXAM: CT SOFT TISSUE NECK W CONTRAST  LOCATION: [facility]  DATE: 10/31/2023     INDICATION: left sided likely peritonsillar abscess, drained 2.5 ml yesterday, now with worsening sweling and pain, trismus, eval for increasing abscess, etc, also eval vessels  COMPARISON: None.  CONTRAST: 75 mL Isovue 370  TECHNIQUE: Routine CT Soft Tissue Neck with IV contrast. Multiplanar reformats. Dose reduction techniques were used.     FINDINGS:      MUCOSAL SPACES/SOFT TISSUES: Hyperenhancement of the palatine tonsils with a 2.6 x 3.4 x 3.5 cm abscess on the left. Mild induration of the left parapharyngeal space. This results in mild narrowing of the nasopharyngeal airway. Normal vocal cords and   infraglottic trachea. Normal oral cavity,  spaces, and floor of mouth structures.     LYMPH NODES: Left cervical adenopathy, likely reactive.     SALIVARY GLANDS: Normal parotid and submandibular glands.     THYROID: Normal.      VESSELS: Vascular structures of the neck are patent.     VISUALIZED INTRACRANIAL/ORBITS/SINUSES: No abnormality of the visualized intracranial compartment or orbits. Visualized paranasal sinuses and mastoid air cells are clear.     OTHER: No destructive osseous lesion. The included lung apices are clear.                                                                      IMPRESSION:   1.  Acute tonsillitis with 3.5 cm abscess in the left palatine tonsil.  2.  No significant airway narrowing or floor of mouth involvement.       ER intervention included given patient starting patient on Unasyn and giving IV Decadron.  An attempt was made to reach ENT which was unsuccessful.  She is being admitted for further inpatient cares.  ER intervention included starting patient on Unasyn    Patient graded her pain is 4 out of 10 when seen in the ER.  She did not want anything for pain      Past Medical History    Pharyngitis    Past Surgical  History   Status post I&D 10/30/2023      Prior to Admission Medications   Prior to Admission Medications   Prescriptions Last Dose Informant Patient Reported? Taking?   amoxicillin-clavulanate (AUGMENTIN) 875-125 MG tablet 10/31/2023 at pm Self No Yes   Sig: Take 1 tablet by mouth 2 times daily for 7 days      Facility-Administered Medications: None        Review of Systems    The 10 point Review of Systems is negative other than noted in the HPI or here.     Social History   I have reviewed this patient's social history and updated it with pertinent information if needed.  Social History     Tobacco Use    Smoking status: Never    Smokeless tobacco: Never         Family History           Allergies   No Known Allergies     Physical Exam   Vital Signs: Temp: 98.7  F (37.1  C) Temp src: Temporal BP: 117/67 Pulse: 75   Resp: 16 SpO2: 97 % O2 Device: None (Room air)    Weight: 0 lbs 0 oz      General Aox3, appropriate affect, NAD, on RA  HEENT dry mucous membranes, EOMI, PERRL  Unable to visualize pharynx  Chest Adeq E b/l, No wheezing  Heart RRR, No M/R/G  Abd- Soft, NT, BS+  - Deferred,   Extremity- Moving all extremities, No digital clubbing,   No edema  Neuro- Aox3, CN II- XII intact, No peripheral vision loss  P5/5, T-N, reflexes 2+, plantar reflex downwards,  gait not checked  No skin rash     Medical Decision Making       70min MINUTES SPENT BY ME on the date of service doing chart review, history, exam, documentation & further activities per the note.          Data     I have personally reviewed the following data over the past 24 hrs:    19.8 (H)  \   13.3   / 379     142 103 11.7 /  270 (H)   3.7 24 0.57 \     ALT: 37 AST: 22 AP: 92 TBILI: 0.2   ALB: 4.0 TOT PROTEIN: 8.0 LIPASE: N/A     Procal: N/A CRP: N/A Lactic Acid: 2.0         Imaging results reviewed over the past 24 hrs:   Recent Results (from the past 24 hour(s))   Soft tissue neck CT w contrast    Addendum: 10/31/2023    Dr. Soto was contacted  by me on 10/31/2023 11:25 PM CDT.      Narrative    EXAM: CT SOFT TISSUE NECK W CONTRAST  LOCATION: [facility]  DATE: 10/31/2023    INDICATION: left sided likely peritonsillar abscess, drained 2.5 ml yesterday, now with worsening sweling and pain, trismus, eval for increasing abscess, etc, also eval vessels  COMPARISON: None.  CONTRAST: 75 mL Isovue 370  TECHNIQUE: Routine CT Soft Tissue Neck with IV contrast. Multiplanar reformats. Dose reduction techniques were used.    FINDINGS:     MUCOSAL SPACES/SOFT TISSUES: Hyperenhancement of the palatine tonsils with a 2.6 x 3.4 x 3.5 cm abscess on the left. Mild induration of the left parapharyngeal space. This results in mild narrowing of the nasopharyngeal airway. Normal vocal cords and   infraglottic trachea. Normal oral cavity,  spaces, and floor of mouth structures.    LYMPH NODES: Left cervical adenopathy, likely reactive.    SALIVARY GLANDS: Normal parotid and submandibular glands.    THYROID: Normal.     VESSELS: Vascular structures of the neck are patent.    VISUALIZED INTRACRANIAL/ORBITS/SINUSES: No abnormality of the visualized intracranial compartment or orbits. Visualized paranasal sinuses and mastoid air cells are clear.    OTHER: No destructive osseous lesion. The included lung apices are clear.      Impression    IMPRESSION:   1.  Acute tonsillitis with 3.5 cm abscess in the left palatine tonsil.  2.  No significant airway narrowing or floor of mouth involvement.

## 2023-11-01 NOTE — ANESTHESIA CARE TRANSFER NOTE
Patient: Radha Castanon    Procedure: Procedure(s):  INCISION AND DRAINAGE, ABSCESS, TONSILLAR OR PERITONSILLAR       Diagnosis: Peritonsillar abscess [J36]  Diagnosis Additional Information: No value filed.    Anesthesia Type:   General     Note:    Oropharynx: oropharynx clear of all foreign objects and spontaneously breathing  Level of Consciousness: awake and drowsy  Oxygen Supplementation: face mask  Level of Supplemental Oxygen (L/min / FiO2): 8  Independent Airway: airway patency satisfactory and stable  Dentition: dentition unchanged  Vital Signs Stable: post-procedure vital signs reviewed and stable  Report to RN Given: handoff report given  Patient transferred to: PACU    Handoff Report: Identifed the Patient, Identified the Reponsible Provider, Reviewed the pertinent medical history, Discussed the surgical course, Reviewed Intra-OP anesthesia mangement and issues during anesthesia, Set expectations for post-procedure period and Allowed opportunity for questions and acknowledgement of understanding      Vitals:  Vitals Value Taken Time   /71 11/01/23 1719   Temp 97.2 F 11/01/23  1719   Pulse 68 11/01/23 1720   Resp 17 11/01/23 1720   SpO2 90 % 11/01/23 1720   Vitals shown include unfiled device data.    Electronically Signed By: TIA Macdonald CRNA  November 1, 2023  5:22 PM

## 2023-11-01 NOTE — PLAN OF CARE
Problem: Adult Inpatient Plan of Care  Goal: Optimal Comfort and Wellbeing  Outcome: Progressing  Intervention: Monitor Pain and Promote Comfort  Recent Flowsheet Documentation  Taken 11/1/2023 1227 by Chantel Layton, RN  Pain Management Interventions: declines  Taken 11/1/2023 0934 by Chantel Layton, RN  Pain Management Interventions: medication (see MAR)   Goal Outcome Evaluation:  Patient will be going to surgery to have peritonsillar abscess drained at 4:30 today. Tylenol given for pain and patient states was helpful. VSS. Receiving IV antibiotics. NPO.    Chantel Layton, RN'

## 2023-11-01 NOTE — ANESTHESIA PROCEDURE NOTES
Airway       Patient location during procedure: OR       Procedure Start/Stop Times: 11/1/2023 4:31 PM  Staff -        CRNA: Shane Ladd APRN CRNA       Performed By: CRNA  Consent for Airway        Urgency: elective  Indications and Patient Condition       Indications for airway management: alex-procedural         Mask difficulty assessment: 1 - vent by mask    Final Airway Details       Final airway type: endotracheal airway       Successful airway: ETT - single  Endotracheal Airway Details        ETT size (mm): 7.5       Cuffed: yes       Successful intubation technique: video laryngoscopy       VL Blade Size: Glidescope 4       Grade View of Cords: 1       Adjucts: stylet       Position: Center       Measured from: lips       Secured at (cm): 22       Bite block used: None    Post intubation assessment        Placement verified by: capnometry, equal breath sounds and chest rise        Number of attempts at approach: 1       Number of other approaches attempted: 0       Secured with: silk tape       Ease of procedure: easy       Dentition: Intact and Unchanged    Medication(s) Administered   Medication Administration Time: 11/1/2023 4:31 PM

## 2023-11-01 NOTE — PROVIDER NOTIFICATION
Phoned Dr Spann to see if pt is to discharge today, he wants her to spend the night and monitor to make sure swelling goes down.

## 2023-11-01 NOTE — ANESTHESIA PREPROCEDURE EVALUATION
Anesthesia Pre-Procedure Evaluation    Patient: Radha Castanon   MRN: [id number] : 1995        Procedure : Procedure(s):  INCISION AND DRAINAGE, ABSCESS, TONSILLAR OR PERITONSILLAR          History reviewed. No pertinent past medical history.   History reviewed. No pertinent surgical history.   No Known Allergies   Social History     Tobacco Use    Smoking status: Never    Smokeless tobacco: Never   Substance Use Topics    Alcohol use: Not on file      Wt Readings from Last 1 Encounters:   10/30/23 81.6 kg (180 lb)        Anesthesia Evaluation            ROS/MED HX  ENT/Pulmonary:  - neg pulmonary ROS     Neurologic:  - neg neurologic ROS     Cardiovascular:  - neg cardiovascular ROS     METS/Exercise Tolerance:     Hematologic:  - neg hematologic  ROS     Musculoskeletal:  - neg musculoskeletal ROS     GI/Hepatic:  - neg GI/hepatic ROS     Renal/Genitourinary:  - neg Renal ROS     Endo:     (+)               Obesity,       Psychiatric/Substance Use:  - neg psychiatric ROS     Infectious Disease:  - neg infectious disease ROS     Malignancy:       Other:            Physical Exam    Airway        Mallampati: II   TM distance: > 3 FB   Neck ROM: full   Mouth opening: < 3 cm    Respiratory Devices and Support         Dental           Cardiovascular   cardiovascular exam normal          Pulmonary   pulmonary exam normal                OUTSIDE LABS:  CBC:   Lab Results   Component Value Date    WBC 13.4 (H) 2023    WBC 19.8 (H) 10/31/2023    HGB 12.5 2023    HGB 13.3 10/31/2023    HCT 38.6 2023    HCT 40.8 10/31/2023     2023     10/31/2023     BMP:   Lab Results   Component Value Date     2023     10/31/2023    POTASSIUM 4.2 2023    POTASSIUM 3.7 10/31/2023    CHLORIDE 106 2023    CHLORIDE 103 10/31/2023    CO2 23 2023    CO2 24 10/31/2023    BUN 9.7 2023    BUN 11.7 10/31/2023    CR 0.44 (L) 2023    CR 0.57 10/31/2023      "(H) 11/01/2023     (H) 11/01/2023     COAGS: No results found for: \"PTT\", \"INR\", \"FIBR\"  POC:   Lab Results   Component Value Date    HCGS Negative 10/31/2023     HEPATIC:   Lab Results   Component Value Date    ALBUMIN 3.6 11/01/2023    PROTTOTAL 7.5 11/01/2023    ALT 32 11/01/2023    AST 21 11/01/2023    ALKPHOS 85 11/01/2023    BILITOTAL 0.2 11/01/2023     OTHER:   Lab Results   Component Value Date    LACT 2.0 10/31/2023    MARIJA 8.0 (L) 11/01/2023       Anesthesia Plan    ASA Status:  3, emergent       Anesthesia Type: General.     - Airway: ETT   Induction: Intravenous.   Maintenance: Balanced.   Techniques and Equipment:     - Airway: Video-Laryngoscope       Consents          - Extended Intubation/Ventilatory Support Discussed: No.      - Patient is DNR/DNI Status: No     Use of blood products discussed: No .     Postoperative Care    Pain management: IV analgesics.   PONV prophylaxis: Ondansetron (or other 5HT-3), Dexamethasone or Solumedrol     Comments:                SARAH LINO MD  "

## 2023-11-01 NOTE — INTERVAL H&P NOTE
"I have reviewed the surgical (or preoperative) H&P that is linked to this encounter, and examined the patient. There are no significant changes    Clinical Conditions Present on Arrival:  Clinically Significant Risk Factors Present on Admission           # Hypocalcemia: Lowest Ca = 8 mg/dL in last 2 days, will monitor and replace as appropriate        # Obesity: Estimated body mass index is 30.9 kg/m  as calculated from the following:    Height as of 10/30/23: 1.626 m (5' 4\").    Weight as of 10/30/23: 81.6 kg (180 lb).       "

## 2023-11-01 NOTE — ED TRIAGE NOTES
Pt arrives ambulatory to triage for throat pain. Pt reports the pain began on Wednesday and has began making her left ear hurt. She was seen here yesterday and the area in her throat was drained. Since then the area has began to swell and is still painful. Pt is breathing normally and able to manage secretions.

## 2023-11-01 NOTE — ED PROVIDER NOTES
EMERGENCY DEPARTMENT ENCOUNTER      NAME: Radha Castanon  AGE: 28 year old female  YOB: 1995  MRN: [id number]  EVALUATION DATE & TIME: 10/31/2023  9:07 PM    PCP: Yao Rich    ED PROVIDER: Deb Soto MD      Chief Complaint   Patient presents with    Oral Swelling    Pharyngitis         FINAL IMPRESSION:  1. Peritonsillar abscess          ED COURSE & MEDICAL DECISION MAKING:    Pertinent Labs & Imaging studies reviewed. (See chart for details)    9:17 PM I introduced myself to the patient, obtained patient history, performed a physical exam, and discussed plan for ED workup including potential diagnostic laboratory/imaging studies and interventions.    28 year old female presents to the Emergency Department for evaluation of worsening throat pain and swelling.  Patient was seen yesterday and clinically diagnosed with a left peritonsillar abscess that was drained and reportedly 2.5 mL of purulent fluid was removed by needle aspiration.  She was given 10 mg Decadron yesterday and started on Augmentin which she reports she has been taking.  She states today she noticed that the swelling seemed to worsen and she was having worsening pain and trouble swallowing thus presented here for evaluation.  She was referred to ENT but not yet able to make an appointment to see them.  On exam, she does have a significant left peritonsillar likely abscess with uvular deviation to the right.  She has trismus and her voice is somewhat muffled.  However she is tolerating her secretions without difficulty and has no signs of respiratory distress or stridor.  Uncertain what this appeared like on exam yesterday but she is reporting this is worsening and it is rather significant on my exam.  Thus will obtain CT of the soft tissue neck to evaluate signs for worsening abscess versus potential drainage complication such as bleeding, etc.  She has no signs of sublingual swelling to suggest Trevon's angina at this point.   She is satting normally on room air.  IV access was established and she was given 50 mg of IV Toradol as well as 1000 mg of IV fluids and 10 mg of IV Decadron.  Laboratory studies obtained.      CBC Reveals a white blood cell count of 19.8 which is up from 16.9 yesterday.  Hemoglobin 13.3.  CMP largely unremarkable with normal creatinine.  Lactic acid within normal limits.  Pregnancy test negative.  CT of the neck reveals acute tonsillitis with 3.5 cm abscess in the left palatine tonsil.  No significant airway narrowing or floor of the mouth involvement.  I did attempt to contact Dr. Spann with the ENT but was unsuccessful.  Do feel that with this worsening and reaccumulation after prior drainage that she does warrant admission for IV antibiotics and ENT assessment for possible repeat drainage.  This is worsened in a short period of time and significantly swollen so again do feel that admission for further management is appropriate.  Patient was in agreement with this plan.  Medicine plans to put in an inpatient ENT consult and excepted the patient for admission.  She was given a dose of IV Unasyn here.  She was admitted to the medicine service in stable condition.         9:17 PM I met with the patient, obtained history, performed an initial exam, and discussed options and plan for diagnostics and treatment here in the ED.   1:29 AM I Spoke with Dr. Brown, Hospitalist. We further discussed the patient's case and reviewed ED work-up so far.She agrees to admit the patient.    At the conclusion of the encounter I discussed the results of all of the tests and the disposition. The questions were answered. The patient or family acknowledged understanding and was agreeable with the care plan.       Medical Decision Making    History:  Supplemental history from: Documented in chart, if applicable  External Record(s) reviewed: Documented in chart, if applicable. and Inpatient Record: ED visit on 10/30/2023    Work  Up:  Chart documentation includes differential considered and any EKGs or imaging independently interpreted by provider.  In additional to work up documented, I considered the following work up: Documented in chart, if applicable.    External consultation:  Discussion of management with another provider: Documented in chart, if applicable and Hospitalist    Complicating factors:  Care impacted by chronic illness: N/A  Care affected by social determinants of health: Access to Medical Care    Disposition considerations: Admit      MEDICATIONS GIVEN IN THE EMERGENCY:  Medications   dextrose 5% and 0.9% NaCl infusion ( Intravenous Restarted 11/1/23 0600)   dextrose 50 % injection 25-50 mL (has no administration in time range)     Or   glucagon injection 1 mg (has no administration in time range)   HYDROmorphone (DILAUDID) injection 0.3 mg (has no administration in time range)   ampicillin-sulbactam (UNASYN) 3 g vial to attach to  mL bag (0 g Intravenous Stopped 11/1/23 0600)   glucose gel 15-30 g (has no administration in time range)     Or   dextrose 50 % injection 25-50 mL (has no administration in time range)     Or   glucagon injection 1 mg (has no administration in time range)   insulin aspart (NovoLOG) injection (RAPID ACTING) (2 Units Subcutaneous $Given 11/1/23 0337)   melatonin tablet 1 mg (has no administration in time range)   sodium chloride 0.9% BOLUS 1,000 mL (0 mLs Intravenous Stopped 10/31/23 2337)   ketorolac (TORADOL) injection 15 mg (15 mg Intravenous $Given 10/31/23 2205)   iopamidol (ISOVUE-370) solution 75 mL (75 mLs Intravenous $Given 10/31/23 2300)   ampicillin-sulbactam (UNASYN) 3 g vial to attach to  mL bag (0 g Intravenous Stopped 11/1/23 0007)   dexAMETHasone PF (DECADRON) injection 10 mg (10 mg Intravenous $Given 11/1/23 0012)       NEW PRESCRIPTIONS STARTED AT TODAY'S ER VISIT  New Prescriptions    No medications on file           =================================================================  [facility]    Patient information was obtained from: The patient    Use of : N/A       Radha Castanon is a 28 year old female who reports she is otherwise healthy who presents to this ED for evaluation of oral swelling and pharyngitis.    Per chart review, the patient was seen at [facility] ED on 10/30/2023 for evaluation of throat pain within the last 3 days. Pain was localized to the left side. I&D performed to localized abscess at left tonsil with 2.5 ml drained out. She was discharged with a 7 day course of Augmentin.    The patient reports having a throat abscess drained yesterday in the ER and developed increased swelling, left otalgia, and chills today. She endorses difficulty swallowing secondary to pain. She has been rotating Tylenol and Ibuprofen for pain control. Her last dose was around yesterday evening. The patient was also started on antibiotics yesterday and took her dose today. She denies taking any chronic medication and any other personal medical history.    Otherwise, the patient denied having chest pain, difficulty breathing, cough, nausea, vomiting, diarrhea, rhinorrhea, abdominal pain, and any other medical complaints or concerns at this time.    REVIEW OF SYSTEMS   Review of Systems   Constitutional:  Positive for chills. Negative for fever.   HENT:  Positive for ear pain (left), sore throat (left-side) and trouble swallowing. Negative for rhinorrhea.         Positive for swelling to left throat   Respiratory:  Negative for shortness of breath.    Cardiovascular:  Negative for chest pain.   Gastrointestinal:  Negative for abdominal pain, diarrhea, nausea and vomiting.      Pertinent positives and negatives are documented in the HPI. All other systems reviewed and are negative.      PAST MEDICAL HISTORY:  History reviewed. No pertinent past medical history.    PAST SURGICAL HISTORY:  History reviewed. No pertinent surgical  history.        CURRENT MEDICATIONS:    amoxicillin-clavulanate (AUGMENTIN) 875-125 MG tablet        ALLERGIES:  No Known Allergies    FAMILY HISTORY:  No family history on file.    SOCIAL HISTORY:   Social History     Socioeconomic History    Marital status: Patient Declined   Tobacco Use    Smoking status: Never    Smokeless tobacco: Never       VITALS:  /82   Pulse 68   Temp 98.7  F (37.1  C) (Temporal)   Resp 16   LMP 10/09/2023 (Exact Date)   SpO2 94%     PHYSICAL EXAM    Physical Exam  Constitutional: Well developed, Well nourished, NAD, GCS 15  HENT: Normocephalic, Atraumatic, Bilateral external ears normal, Oropharynx exam shows large peritonsillar swelling on the left with uvular deviation to the right, her voice is somewhat muffled but she is tolerating secretions without difficulty and in no respiratory distress, does have trismus noted, mucous membranes moist, Nose normal. Neck- Normal range of motion, No tenderness, Supple, No stridor.   Eyes: PERRL, EOMI, Conjunctiva normal, No discharge.   Respiratory: Normal breath sounds, No respiratory distress, No wheezing or crackles, Speaks in full sentences easily.    Cardiovascular: Normal heart rate, Regular rhythm,  No murmurs, No rubs, No gallops. 2+ radial pulses bilaterally  GI: Bowel sounds normal, Soft, No tenderness, No masses, No rebound or guarding.   Musculoskeletal: 2+ DP pulses. No notable lower extremity edema. No cyanosis, No clubbing. Good range of motion in all major joints.   Integument: Warm, Dry, No erythema, No rash. No petechiae.   Neurologic: Alert & oriented x 3, 5/5 strength in all 4 extremities bilaterally. Sensation intact to light touch in all 4 extremities and the face bilaterally. No focal deficits noted.   Psychiatric: Affect normal, Judgment normal, Mood normal. Cooperative.      LAB:  All pertinent labs reviewed and interpreted.  Results for orders placed or performed during the hospital encounter of 10/31/23   Soft  tissue neck CT w contrast    Impression    IMPRESSION:   1.  Acute tonsillitis with 3.5 cm abscess in the left palatine tonsil.  2.  No significant airway narrowing or floor of mouth involvement.   Comprehensive metabolic panel   Result Value Ref Range    Sodium 142 135 - 145 mmol/L    Potassium 3.7 3.4 - 5.3 mmol/L    Carbon Dioxide (CO2) 24 22 - 29 mmol/L    Anion Gap 15 7 - 15 mmol/L    Urea Nitrogen 11.7 6.0 - 20.0 mg/dL    Creatinine 0.57 0.51 - 0.95 mg/dL    GFR Estimate >90 >60 mL/min/1.73m2    Calcium 8.6 8.6 - 10.0 mg/dL    Chloride 103 98 - 107 mmol/L    Glucose 270 (H) 70 - 99 mg/dL    Alkaline Phosphatase 92 35 - 104 U/L    AST 22 0 - 45 U/L    ALT 37 0 - 50 U/L    Protein Total 8.0 6.4 - 8.3 g/dL    Albumin 4.0 3.5 - 5.2 g/dL    Bilirubin Total 0.2 <=1.2 mg/dL   Lactic acid whole blood   Result Value Ref Range    Lactic Acid 2.0 0.7 - 2.0 mmol/L   HCG qualitative Blood   Result Value Ref Range    hCG Serum Qualitative Negative Negative   CBC with platelets and differential   Result Value Ref Range    WBC Count 19.8 (H) 4.0 - 11.0 10e3/uL    RBC Count 4.38 3.80 - 5.20 10e6/uL    Hemoglobin 13.3 11.7 - 15.7 g/dL    Hematocrit 40.8 35.0 - 47.0 %    MCV 93 78 - 100 fL    MCH 30.4 26.5 - 33.0 pg    MCHC 32.6 31.5 - 36.5 g/dL    RDW 11.8 10.0 - 15.0 %    Platelet Count 379 150 - 450 10e3/uL    % Neutrophils 79 %    % Lymphocytes 14 %    % Monocytes 6 %    % Eosinophils 0 %    % Basophils 0 %    % Immature Granulocytes 1 %    NRBCs per 100 WBC 0 <1 /100    Absolute Neutrophils 15.6 (H) 1.6 - 8.3 10e3/uL    Absolute Lymphocytes 2.8 0.8 - 5.3 10e3/uL    Absolute Monocytes 1.2 0.0 - 1.3 10e3/uL    Absolute Eosinophils 0.0 0.0 - 0.7 10e3/uL    Absolute Basophils 0.0 0.0 - 0.2 10e3/uL    Absolute Immature Granulocytes 0.1 <=0.4 10e3/uL    Absolute NRBCs 0.0 10e3/uL   Comprehensive metabolic panel   Result Value Ref Range    Sodium 141 135 - 145 mmol/L    Potassium 4.2 3.4 - 5.3 mmol/L    Carbon Dioxide (CO2) 23 22  - 29 mmol/L    Anion Gap 12 7 - 15 mmol/L    Urea Nitrogen 9.7 6.0 - 20.0 mg/dL    Creatinine 0.44 (L) 0.51 - 0.95 mg/dL    GFR Estimate >90 >60 mL/min/1.73m2    Calcium 8.0 (L) 8.6 - 10.0 mg/dL    Chloride 106 98 - 107 mmol/L    Glucose 289 (H) 70 - 99 mg/dL    Alkaline Phosphatase 85 35 - 104 U/L    AST 21 0 - 45 U/L    ALT 32 0 - 50 U/L    Protein Total 7.5 6.4 - 8.3 g/dL    Albumin 3.6 3.5 - 5.2 g/dL    Bilirubin Total 0.2 <=1.2 mg/dL   Glucose by meter   Result Value Ref Range    GLUCOSE BY METER POCT 283 (H) 70 - 99 mg/dL   CBC with platelets and differential   Result Value Ref Range    WBC Count 13.4 (H) 4.0 - 11.0 10e3/uL    RBC Count 4.13 3.80 - 5.20 10e6/uL    Hemoglobin 12.5 11.7 - 15.7 g/dL    Hematocrit 38.6 35.0 - 47.0 %    MCV 94 78 - 100 fL    MCH 30.3 26.5 - 33.0 pg    MCHC 32.4 31.5 - 36.5 g/dL    RDW 11.9 10.0 - 15.0 %    Platelet Count 331 150 - 450 10e3/uL    % Neutrophils 89 %    % Lymphocytes 9 %    % Monocytes 1 %    % Eosinophils 0 %    % Basophils 0 %    % Immature Granulocytes 1 %    NRBCs per 100 WBC 0 <1 /100    Absolute Neutrophils 11.8 (H) 1.6 - 8.3 10e3/uL    Absolute Lymphocytes 1.2 0.8 - 5.3 10e3/uL    Absolute Monocytes 0.2 0.0 - 1.3 10e3/uL    Absolute Eosinophils 0.0 0.0 - 0.7 10e3/uL    Absolute Basophils 0.0 0.0 - 0.2 10e3/uL    Absolute Immature Granulocytes 0.2 <=0.4 10e3/uL    Absolute NRBCs 0.0 10e3/uL       RADIOLOGY:  Reviewed all pertinent imaging. Please see official radiology report.  Soft tissue neck CT w contrast   Final Result   Addendum (preliminary) 1 of 1   Dr. Soto was contacted by me on 10/31/2023 11:25 PM CDT.      Final   IMPRESSION:    1.  Acute tonsillitis with 3.5 cm abscess in the left palatine tonsil.   2.  No significant airway narrowing or floor of mouth involvement.          PROCEDURES:   None      [facility] [employer] [facility] Documentation:   CMS Diagnoses:               Shahid KAMARA am serving as a scribe to document services personally performed  by Deb Soto MD based on my observation and the provider's statements to me. I, Deb Soto MD, attest that Shahidangel Arvizu is acting in a scribe capacity, has observed my performance of the services and has documented them in accordance with my direction.    Deb Soto MD  [facility] EMERGENCY DEPARTMENT  [address] [place] [id number]  [phone]     Deb Soto MD  11/01/23 0709

## 2023-11-01 NOTE — OP NOTE
OTOLARYNGOLOGY OPERATIVE NOTE    PREOPERATIVE DIAGNOSES:  Peritonsillar Abscess LEFT      POSTOPERATIVE DIAGNOSES: same      PROCEDURE PERFORMED:  Incision and Drainage of Peritonsillar Abscess    SURGEON: Hussein Spann MD    BLOOD LOSS: Less than 5 mL    COMPLICATIONS: None.     SPECIMENS: culture to microbiology    ANESTHESIA: GETA.     FINDINGS: 7-8 mls of yesenia pus expressed;  left tonsillar edema with uvular devation        OPERATIVE PROCEDURE: After being taken to the operating room and induction of general endotracheal tube anesthesia, a pause was conducted to identify the patient by name, birthday, and procedure.    The bed was  rotated 90 degrees.Then a shoulder roll and head warp were placed. I suspended the patient from the [place] stand using a [place]aer mouthgag.     Next,  5 ml of !5 lidocaine with epi at 1:100,000 was injected into the LEFT tonsillar pillar      After 5 minutes, an 18 G needle was used to aspirate 3 ml of yesenia pus that was send for culture.     A 15 blade was use to make stab incision superiorly.  A curved hemostat was used to open up the peristonsillar space.  Yesenia purulence was encountered.   Suction cautery was used to achieve hemostasis.  The incision closed with simple interuppted 4-5 chromic.      An OG tube was then passed to empty the proximal esophagus of secretions.

## 2023-11-01 NOTE — MEDICATION SCRIBE - ADMISSION MEDICATION HISTORY
Medication Scribe Admission Medication History    Admission medication history is complete. The information provided in this note is only as accurate as the sources available at the time of the update.    Information Source(s): Patient via in-person    Pertinent Information:     Changes made to PTA medication list:  Added: None  Deleted: None  Changed: None    Medication Affordability:  Not including over the counter (OTC) medications, was there a time in the past 3 months when you did not take your medications as prescribed because of cost?: No    Allergies reviewed with patient and updates made in EHR: yes    Medication History Completed By: Kandis Riley 11/1/2023 12:00 AM    PTA Med List   Medication Sig Last Dose    amoxicillin-clavulanate (AUGMENTIN) 875-125 MG tablet Take 1 tablet by mouth 2 times daily for 7 days 10/31/2023 at pm

## 2023-11-01 NOTE — CONSULTS
Chief Complaint   Patient presents with    Oral Swelling    Pharyngitis     History of Present Illness  Radha Castanon is a 28 year old female admitted overnight with radiologic evidence of peritonsillar abscess.     CT SOFT TISSUE NECK:   1.  Acute tonsillitis with 3.5 cm abscess in the left palatine tonsil.  2.  No significant airway narrowing or floor of mouth involvement.    Past Medical History  Patient Active Problem List   Diagnosis    Peritoneal abscess (H)    Peritonsillar abscess     Current Medications    Current Facility-Administered Medications:     ampicillin-sulbactam (UNASYN) 3 g vial to attach to  mL bag, 3 g, Intravenous, Q6H, Leela Brown MD, Stopped at 11/01/23 0600    dextrose 5% and 0.9% NaCl infusion, , Intravenous, Continuous, Leela Brown MD, Last Rate: 100 mL/hr at 11/01/23 0600, Restarted at 11/01/23 0600    glucose gel 15-30 g, 15-30 g, Oral, Q15 Min PRN **OR** dextrose 50 % injection 25-50 mL, 25-50 mL, Intravenous, Q15 Min PRN **OR** glucagon injection 1 mg, 1 mg, Subcutaneous, Q15 Min PRN, Leela Brown MD    HYDROmorphone (DILAUDID) injection 0.3 mg, 0.3 mg, Intravenous, Q3H PRN, Leela Brown MD    insulin aspart (NovoLOG) injection (RAPID ACTING), 1-4 Units, Subcutaneous, Q4H ROBERTO, Leela Brown MD, 2 Units at 11/01/23 0337    melatonin tablet 1 mg, 1 mg, Oral, At Bedtime PRN, Leela Brown MD    naloxone (NARCAN) injection 0.2 mg, 0.2 mg, Intravenous, Q2 Min PRN **OR** naloxone (NARCAN) injection 0.4 mg, 0.4 mg, Intravenous, Q2 Min PRN **OR** naloxone (NARCAN) injection 0.2 mg, 0.2 mg, Intramuscular, Q2 Min PRN **OR** naloxone (NARCAN) injection 0.4 mg, 0.4 mg, Intramuscular, Q2 Min PRN, Olivier Almeida MD    Current Outpatient Medications:     amoxicillin-clavulanate (AUGMENTIN) 875-125 MG tablet, Take 1 tablet by mouth 2 times daily for 7 days, Disp: 14 tablet, Rfl: 0    Allergies  No Known Allergies    Social History  Social History     Socioeconomic History     Marital status: Patient Declined     Spouse name: None    Number of children: None    Years of education: None    Highest education level: None   Tobacco Use    Smoking status: Never    Smokeless tobacco: Never       Family History  No family history on file.    Review of Systems  As per HPI and PMHx, otherwise 10 system review including the head and neck, constitutional, eyes, respiratory, GI, skin, neurologic, lymphatic, endocrine, and allergy systems is negative.    Physical Exam  /82   Pulse 68   Temp 98.7  F (37.1  C) (Temporal)   Resp 16   LMP 10/09/2023 (Exact Date)   SpO2 94%     CBC RESULTS:   Recent Labs   Lab Test 11/01/23  0607   WBC 13.4*   RBC 4.13   HGB 12.5   HCT 38.6   MCV 94   MCH 30.3   MCHC 32.4   RDW 11.9           Assessment and Plan     ICD-10-CM    1. Peritonsillar abscess  J36 Case Request: INCISION AND DRAINAGE, ABSCESS, TONSILLAR OR PERITONSILLAR     Case Request: INCISION AND DRAINAGE, ABSCESS, TONSILLAR OR PERITONSILLAR         Plan for I and D under general anethesia today at 4:30 PM.   NPO      Hussein Spann MD  Department of Otolaryngology-Head and Neck Surgery  [facility]

## 2023-11-02 VITALS
BODY MASS INDEX: 33.05 KG/M2 | HEART RATE: 64 BPM | HEIGHT: 64 IN | RESPIRATION RATE: 20 BRPM | DIASTOLIC BLOOD PRESSURE: 82 MMHG | WEIGHT: 193.56 LBS | OXYGEN SATURATION: 94 % | SYSTOLIC BLOOD PRESSURE: 139 MMHG | TEMPERATURE: 98.5 F

## 2023-11-02 LAB
ERYTHROCYTE [DISTWIDTH] IN BLOOD BY AUTOMATED COUNT: 11.6 % (ref 10–15)
GLUCOSE BLDC GLUCOMTR-MCNC: 143 MG/DL (ref 70–99)
GLUCOSE BLDC GLUCOMTR-MCNC: 147 MG/DL (ref 70–99)
GLUCOSE BLDC GLUCOMTR-MCNC: 183 MG/DL (ref 70–99)
GLUCOSE BLDC GLUCOMTR-MCNC: 187 MG/DL (ref 70–99)
GLUCOSE BLDC GLUCOMTR-MCNC: 217 MG/DL (ref 70–99)
HCT VFR BLD AUTO: 39.8 % (ref 35–47)
HGB BLD-MCNC: 13.1 G/DL (ref 11.7–15.7)
HOLD SPECIMEN: NORMAL
MCH RBC QN AUTO: 30.3 PG (ref 26.5–33)
MCHC RBC AUTO-ENTMCNC: 32.9 G/DL (ref 31.5–36.5)
MCV RBC AUTO: 92 FL (ref 78–100)
PLATELET # BLD AUTO: 367 10E3/UL (ref 150–450)
RBC # BLD AUTO: 4.32 10E6/UL (ref 3.8–5.2)
WBC # BLD AUTO: 12.6 10E3/UL (ref 4–11)

## 2023-11-02 PROCEDURE — 85027 COMPLETE CBC AUTOMATED: CPT | Performed by: HOSPITALIST

## 2023-11-02 PROCEDURE — 250N000011 HC RX IP 250 OP 636: Performed by: INTERNAL MEDICINE

## 2023-11-02 PROCEDURE — 36415 COLL VENOUS BLD VENIPUNCTURE: CPT | Performed by: HOSPITALIST

## 2023-11-02 PROCEDURE — 99239 HOSP IP/OBS DSCHRG MGMT >30: CPT | Performed by: HOSPITALIST

## 2023-11-02 PROCEDURE — 99207 PR NO BILLABLE SERVICE THIS VISIT: CPT | Performed by: HOSPITALIST

## 2023-11-02 RX ORDER — METHYLPREDNISOLONE 4 MG
TABLET, DOSE PACK ORAL
Qty: 21 TABLET | Refills: 0 | Status: SHIPPED | OUTPATIENT
Start: 2023-11-02

## 2023-11-02 RX ADMIN — AMPICILLIN SODIUM AND SULBACTAM SODIUM 3 G: 2; 1 INJECTION, POWDER, FOR SOLUTION INTRAMUSCULAR; INTRAVENOUS at 01:38

## 2023-11-02 RX ADMIN — INSULIN ASPART 1 UNITS: 100 INJECTION, SOLUTION INTRAVENOUS; SUBCUTANEOUS at 16:42

## 2023-11-02 RX ADMIN — INSULIN ASPART 1 UNITS: 100 INJECTION, SOLUTION INTRAVENOUS; SUBCUTANEOUS at 08:58

## 2023-11-02 RX ADMIN — INSULIN ASPART 1 UNITS: 100 INJECTION, SOLUTION INTRAVENOUS; SUBCUTANEOUS at 05:30

## 2023-11-02 RX ADMIN — INSULIN ASPART 1 UNITS: 100 INJECTION, SOLUTION INTRAVENOUS; SUBCUTANEOUS at 01:19

## 2023-11-02 RX ADMIN — AMPICILLIN SODIUM AND SULBACTAM SODIUM 3 G: 2; 1 INJECTION, POWDER, FOR SOLUTION INTRAMUSCULAR; INTRAVENOUS at 07:14

## 2023-11-02 RX ADMIN — AMPICILLIN SODIUM AND SULBACTAM SODIUM 3 G: 2; 1 INJECTION, POWDER, FOR SOLUTION INTRAMUSCULAR; INTRAVENOUS at 12:49

## 2023-11-02 RX ADMIN — INSULIN ASPART 1 UNITS: 100 INJECTION, SOLUTION INTRAVENOUS; SUBCUTANEOUS at 12:08

## 2023-11-02 ASSESSMENT — ACTIVITIES OF DAILY LIVING (ADL)
ADLS_ACUITY_SCORE: 20

## 2023-11-02 NOTE — PROGRESS NOTES
"Radha Castanon is a 28 year old female patient. POD #1 from I and D of a LEFT peritonsillar abscess.  Pain is minimal and tolerating soft diet.   1. Peritonsillar abscess      History reviewed. No pertinent past medical history.  Current Outpatient Medications   Medication Sig Dispense Refill    amoxicillin-clavulanate (AUGMENTIN) 875-125 MG tablet Take 1 tablet by mouth 2 times daily for 10 days 20 tablet 0    methylPREDNISolone (MEDROL DOSEPAK) 4 MG tablet therapy pack Follow Package Directions 21 tablet 0    oxyCODONE (ROXICODONE) 5 MG/5ML solution Take 5.5 mLs (5.5 mg) by mouth every 6 hours as needed for moderate to severe pain 75 mL 0     No Known Allergies  Principal Problem:    Peritonsillar abscess  Active Problems:    Peritoneal abscess (H)    Blood pressure 139/82, pulse 64, temperature 98.5  F (36.9  C), temperature source Oral, resp. rate 20, height 1.626 m (5' 4.02\"), weight 87.8 kg (193 lb 9 oz), last menstrual period 10/09/2023, SpO2 94%.    Subjective  Clinically improved in terms of pain and swallowing  Objective Vital signs:  Temp: 98.5  F (36.9  C) Temp src: Oral BP: 139/82 Pulse: 64   Resp: 20 SpO2: 94 % O2 Device: None (Room air) Oxygen Delivery: 1 LPM Height: 162.6 cm (5' 4.02\") Weight: 87.8 kg (193 lb 9 oz)  Estimated body mass index is 33.21 kg/m  as calculated from the following:    Height as of this encounter: 1.626 m (5' 4.02\").    Weight as of this encounter: 87.8 kg (193 lb 9 oz).       Assessment & Plan    Patient can be discharged on 10 day course of augmentin and medrol dosepack.     Diet: soft, advance as tolerated    My office will contact her for follow up in 3-6 weeks.     Hussein Spann MD  11/2/2023      "

## 2023-11-02 NOTE — DISCHARGE SUMMARY
[facility] MEDICINE  DISCHARGE SUMMARY     Primary Care Physician: Yao Rich  Admission Date: 10/31/2023   Discharge Provider: Elissa Hays MD Discharge Date: 11/2/2023   Diet:   Active Diet and Nourishment Order   Procedures    Mechanical/Dental Soft Diet    Diet       Code Status: Full Code   Activity: DCACTIVITY: Activity as tolerated        Condition at Discharge: Stable     REASON FOR PRESENTATION(See Admission Note for Details)     Throat pain, peritonsillar abscess    PRINCIPAL & ACTIVE DISCHARGE DIAGNOSES     Principal Problem:    Peritonsillar abscess  Active Problems:    Peritoneal abscess (H)      PENDING LABS     Unresulted Labs Ordered in the Past 30 Days of this Admission       Date and Time Order Name Status Description    11/1/2023  4:48 PM Abscess Aerobic Bacterial Culture Routine Preliminary     11/1/2023  4:48 PM Anaerobic Bacterial Culture Routine In process               PROCEDURES ( this hospitalization only)      Procedure(s):  INCISION AND DRAINAGE, ABSCESS, LEFT TONSILLAR OR PERITONSILLAR    RECOMMENDATIONS TO OUTPATIENT PROVIDER FOR F/U VISIT     Follow-up Appointments     Follow-up and recommended labs and tests       Follow up with primary care provider, Yao Rich, within 7 days for   hospital follow- up.  No follow up labs or test are needed.    Follow up with Dr. Spann, ENT in 2-3 weeks            DISPOSITION     Home    SUMMARY OF HOSPITAL COURSE:    Peritonsillar abscess  -CT soft tissue showed acute tonsillitis with 3.5 cm peritonsillar abscess on left side  -Failed out-pt treatment with Augmentin  -S/p I&D on 11/1  -On Unasyn while inpatient  -Appreciate ENT input, discharged on PO Augmentin X 10 days and medrol dose micha with follow up in 2-3 weeks  -Cx pending  -Pain control  -Tolerating soft diet prior to discharge    .Patient stable to be discharged home today. Please refer to discharge medications and instructions for more  "details.        Discharge Medications with Med changes:     Current Discharge Medication List        START taking these medications    Details   methylPREDNISolone (MEDROL DOSEPAK) 4 MG tablet therapy pack Follow Package Directions  Qty: 21 tablet, Refills: 0    Associated Diagnoses: Peritonsillar abscess      oxyCODONE (ROXICODONE) 5 MG/5ML solution Take 5.5 mLs (5.5 mg) by mouth every 6 hours as needed for moderate to severe pain  Qty: 75 mL, Refills: 0    Associated Diagnoses: Peritonsillar abscess           CONTINUE these medications which have CHANGED    Details   amoxicillin-clavulanate (AUGMENTIN) 875-125 MG tablet Take 1 tablet by mouth 2 times daily for 10 days  Qty: 20 tablet, Refills: 0    Associated Diagnoses: Peritonsillar abscess                   Rationale for medication changes:      See med rec        Consults       ENT IP CONSULT    Immunizations given this encounter     Most Recent Immunizations   Administered Date(s) Administered    COVID-19 Bivalent 12+ (Pfizer) 12/13/2022    COVID-19 MONOVALENT 12+ (Pfizer) 05/16/2021           Anticoagulation Information      Recent INR results: No results for input(s): \"INR\" in the last 168 hours.  Warfarin doses (if applicable) or name of other anticoagulant: N/A      SIGNIFICANT IMAGING FINDINGS     Results for orders placed or performed during the hospital encounter of 10/31/23   Soft tissue neck CT w contrast    Impression    IMPRESSION:   1.  Acute tonsillitis with 3.5 cm abscess in the left palatine tonsil.  2.  No significant airway narrowing or floor of mouth involvement.       SIGNIFICANT LABORATORY FINDINGS       Discharge Orders        Discharge Instructions     Return to clinic as instructed by Physician     Diet Instructions    Soft diet as tolerated     Reason for your hospital stay    Peritonsillar abscess     Follow-up and recommended labs and tests     Follow up with primary care provider, Yao Rich, within 7 days for hospital follow- up.  " No follow up labs or test are needed.    Follow up with Dr. Spann, ENT in 2-3 weeks     Activity    Your activity upon discharge: activity as tolerated     Diet    Follow this diet upon discharge: Orders Placed This Encounter      Mechanical/Dental Soft Diet       Examination   Physical Exam   Temp:  [98.1  F (36.7  C)-98.6  F (37  C)] 98.5  F (36.9  C)  Pulse:  [59-79] 64  Resp:  [20] 20  BP: (123-139)/(77-86) 139/82  SpO2:  [92 %-97 %] 94 %  Wt Readings from Last 1 Encounters:   11/01/23 87.8 kg (193 lb 9 oz)     General: Pleasant,young female in NAD  HEENT:EOMI, AT,NC. Pt unable to open mouth wide enough to view palate  CVS:RRR, no edema  RS:CTAB  Abd: Soft, NT,ND  Neurology:Grossly normal  Psy:Approrpiate affect        Please see EMR for more detailed significant labs, imaging, consultant notes etc.    IElissa MD, personally saw the patient today and spent greater than 30 minutes discharging this patient.    Elissa Hays MD  [facility]    CC:Yao Rich

## 2023-11-02 NOTE — CARE PLAN
Pt is alert and oriented x4, denies pain, order a food and ate 100%. Abx infused per order. Probably discharge today per pt.

## 2023-11-02 NOTE — PLAN OF CARE
"  Problem: Adult Inpatient Plan of Care  Goal: Plan of Care Review  Outcome: Progressing   Goal Outcome Evaluation:         Patient alert and oriented. Able to communicate her needs. Denies any throat discomfort. Patient does state the back of her throat \"itches\". Taking in clear liquids without difficulty. Voiding clear yellow urine. ENT following. Will discharge to home when cleared by ENT.  Lisa Miller RN               "

## 2023-11-02 NOTE — ANESTHESIA POSTPROCEDURE EVALUATION
Patient: Radha Castanon    Procedure: Procedure(s):  INCISION AND DRAINAGE, ABSCESS, TONSILLAR OR PERITONSILLAR       Anesthesia Type:  General    Note:  Disposition: Inpatient   Postop Pain Control: Uneventful            Sign Out: Well controlled pain   PONV: No   Neuro/Psych: Uneventful            Sign Out: Acceptable/Baseline neuro status   Airway/Respiratory: Uneventful            Sign Out: Acceptable/Baseline resp. status   CV/Hemodynamics: Uneventful            Sign Out: Acceptable CV status; No obvious hypovolemia; No obvious fluid overload   Other NRE: NONE   DID A NON-ROUTINE EVENT OCCUR?            Last vitals:  Vitals Value Taken Time   /79 11/01/23 1815   Temp 36.3  C (97.3  F) 11/01/23 1720   Pulse 63 11/01/23 1822   Resp 20 11/01/23 1822   SpO2 97 % 11/01/23 1822   Vitals shown include unfiled device data.    Electronically Signed By: Evelia Fink MD  November 1, 2023  7:37 PM

## 2023-11-02 NOTE — PLAN OF CARE
Problem: Adult Inpatient Plan of Care  Goal: Optimal Comfort and Wellbeing  Outcome: Progressing     Problem: Surgery Nonspecified  Goal: Optimal Pain Control and Function  Outcome: Progressing     Problem: Surgery Nonspecified  Goal: Effective Oxygenation and Ventilation  Outcome: Progressing  Intervention: Optimize Oxygenation and Ventilation  Recent Flowsheet Documentation  Taken 11/2/2023 0100 by Patricia Collins RN  Head of Bed (HOB) Positioning: HOB at 20-30 degrees     Problem: Adult Inpatient Plan of Care  Goal: Absence of Hospital-Acquired Illness or Injury  Intervention: Prevent Skin Injury  Recent Flowsheet Documentation  Taken 11/2/2023 0100 by Patricia Collins RN  Body Position: position changed independently    A&Ox4, cooperative, on nasal oxygen @1Lpm, afebrile to touched, acyanosed, nil pedal or ankle edema, not pale or dehydrated, no obvious respiratory distress, she complained of throat itch but she was reassured and educated on the cause, patient denies pain, her glucose level was controlled with Insulin,  and she on ampicillin scheduled 6hly.  Will continue to monitor.

## 2023-11-02 NOTE — PLAN OF CARE
Goal Outcome Evaluation:      Plan of Care Reviewed With: patient    Overall Patient Progress: improvingOverall Patient Progress: improving    Outcome Evaluation: Endorses throat pain, declines medication. IV SL per orders. Tolerating clear liquids, declines anything further at this point. Up independently.      Problem: Surgery Nonspecified  Goal: Blood Glucose Level Within Targeted Range  Outcome: Progressing   Blood sugar checks q4h, last . 1 U Novolog given.    Problem: Surgery Nonspecified  Goal: Effective Oxygenation and Ventilation  Outcome: Progressing   Remains on 1L NC.     Call light within reach, instructed pt to utilize call light if feeling unsteady.

## 2023-11-02 NOTE — PROGRESS NOTES
"[facility]    Medicine Progress Note - Hospitalist Service    Date of Admission:  10/31/2023    Assessment & Plan     Peritonsillar abscess  -CT soft tissue showed acute tonsillitis with 3.5 cm peritonsillar abscess on left side  -Failed out-pt treatment with Augmentin  -S/p I&D on 11/1  -Appreciate ENT input, await reccs from today.  -Cx pending  -Continue Unasyn  -Pain control  -Diet per ENT, stop IVF today          Diet: Advance Diet as Tolerated: Clear Liquid Diet; Mechanical/Dental Soft Diet    DVT Prophylaxis: Low Risk/Ambulatory with no VTE prophylaxis indicated  Sierra Catheter: Not present  Lines: None     Cardiac Monitoring: None  Code Status: Full Code      Clinically Significant Risk Factors          # Hypocalcemia: Lowest Ca = 8 mg/dL in last 2 days, will monitor and replace as appropriate                # Obesity: Estimated body mass index is 33.21 kg/m  as calculated from the following:    Height as of this encounter: 1.626 m (5' 4.02\").    Weight as of this encounter: 87.8 kg (193 lb 9 oz)., PRESENT ON ADMISSION            Disposition Plan      Expected Discharge Date: 11/02/2023        Discharge Comments: Pending ENT reccs for today            Elissa Hays MD  Hospitalist Service  [facility]  Securely message with [employer] (more info)  Text page via [employer] Paging/Directory   ______________________________________________________________________    Interval History   .  Patient is new to me. Chart reviewed and events noted.  Patient seen and examined.   States doing well overall. Throat feels scratchy. Pain tolerable. No SOB, chest pain, N/C, Some cough. No fever.       Physical Exam   Vital Signs: Temp: 98.3  F (36.8  C) Temp src: Oral BP: 123/78 Pulse: 59   Resp: 20 SpO2: 95 % O2 Device: None (Room air) Oxygen Delivery: 1 LPM  Weight: 193 lbs 9.02 oz    General: Pleasant,young female in NAD  HEENT:EOMI, AT,NC. Pt unable to open mouth wide enough to " view the surgical site.   CVS:RRR, no edema  RS:CTAB  Abd: Soft, NT,ND  Neurology:Grossly normal  Psy:Approrpiate affect      Medical Decision Making       >35 MINUTES SPENT BY ME on the date of service doing chart review, history, exam, documentation & further activities per the note.  MANAGEMENT DISCUSSED with the following over the past 24 hours: Patient, bedside RN, SW/CM       Data     I have personally reviewed the following data over the past 24 hrs:    12.6 (H)  \   13.1   / 367     N/A N/A N/A /  183 (H)   N/A N/A N/A \

## 2023-11-03 ENCOUNTER — OFFICE VISIT (OUTPATIENT)
Dept: FAMILY MEDICINE | Facility: CLINIC | Age: 28
End: 2023-11-03
Payer: COMMERCIAL

## 2023-11-03 ENCOUNTER — TELEPHONE (OUTPATIENT)
Dept: OTOLARYNGOLOGY | Facility: CLINIC | Age: 28
End: 2023-11-03

## 2023-11-03 VITALS
WEIGHT: 185 LBS | DIASTOLIC BLOOD PRESSURE: 87 MMHG | HEART RATE: 81 BPM | OXYGEN SATURATION: 96 % | SYSTOLIC BLOOD PRESSURE: 115 MMHG | HEIGHT: 65 IN | BODY MASS INDEX: 30.82 KG/M2

## 2023-11-03 DIAGNOSIS — Z09 HOSPITAL DISCHARGE FOLLOW-UP: Primary | ICD-10-CM

## 2023-11-03 DIAGNOSIS — K65.1 PERITONEAL ABSCESS (H): ICD-10-CM

## 2023-11-03 DIAGNOSIS — Z76.89 ESTABLISHING CARE WITH NEW DOCTOR, ENCOUNTER FOR: ICD-10-CM

## 2023-11-03 PROCEDURE — 99495 TRANSJ CARE MGMT MOD F2F 14D: CPT | Mod: GC

## 2023-11-03 NOTE — TELEPHONE ENCOUNTER
Spoke with Radha for ER follow up for PTA.  Pt is feeling good and was seen at her PCP today.  Scheduled pt for a follow up with ENT.    [facility]      Melania Delcid RN  [facility]  ENT  [facility] [address] [address]  Office:[phone]  Employed by [facility]

## 2023-11-03 NOTE — PROGRESS NOTES
Pt discharged at 1930. Pt walked with nurse to front entry where they met family for ride. All IV removed. Discharge paperwork went over with pt, all questions answered by nurse.

## 2023-11-03 NOTE — PROGRESS NOTES
I have personally reviewed the history and examination as documented by Dr. Rich.  I was present during key portions of the visit and agree with the assessment and plan as documented for 28 yr old female with recent hospitalization for a peritonsillar abscess here for hospital follow-up/ establish care visit. Doing well on Abx/ steroids/ pain medication. Has ENT follow-up already coordinated. Will have her return for well visit in the future. Precautions given. Anticipatory guidance given.     Kashif Burt MD  November 3, 2023  10:26 AM

## 2023-11-03 NOTE — NURSING NOTE
EVELIA filled out and faxed 11/3 to:     [facility] -- [phone]    [facility] -- [phone]

## 2023-11-03 NOTE — PROGRESS NOTES
Assessment & Plan     (Z09) Hospital discharge follow-up  (primary encounter diagnosis)  (K65.1) Peritoneal abscess (H)  Comment: Patient had throat pain with fever and chills start 10 days ago. On 10/30 she had a peritonsillar abscess drained in the ED. 10/31 returned at symptoms persisted -> 11/1 OR I&D of peritonsillar abscesses. Fevers stopped after OR drainage. Patient tolerated soft diet before discharge yesterday evening and is able to drink water well. Continues to have some mild throat pain/itchiness/swelling but on exam there appears to be a left sided lesion showing granulation tissue and no surrounding erythema or purulence - appears to be well healing. She is taking her antibiotics but has not started her steroids or oxycodone as they were not filled last night before discharge.   Plan:    - Keep follow up with ENT Dr. Spann in 2 weeks - soft diet until then   - Continue to use mouthwash twice daily and brush teeth as tolerated   - Encouraged patient to increase water intake   - Discussed that she should continue her antibiotics course and start her steroid therapy today   - Oxycodone prn for pain relief. Can also use tylenol. DO NOT use ibuprofen or NSAIDs with steroid therapy   - Post hospitalization med rec complete       (Z76.89) Establishing care with new doctor, encounter for  Comment: Patient with uncomplicated past medical history. See EMR for FMH, surgical Hx, social Hx, medical hx. Patient previously following with [facility] and receiving regular immunizations. Will complete EVELIA form to obtain her records from there.   Plan:    - Reviewed patients past medical history   - Completed EVELIA to obtain patient records   - Follow up in 1-2 months for routine physical +/- lab testing, pap smear, immunizations          MED REC REQUIRED  Post Medication Reconciliation Status: discharge medications reconciled, continue medications without change  BMI:   Estimated body mass index is 31.01 kg/m  as  "calculated from the following:    Height as of this encounter: 1.645 m (5' 4.76\").    Weight as of this encounter: 83.9 kg (185 lb).       Keep Follow up with Dr. Spann ENT in 2 weeks     Return in about 2 months (around 1/3/2024) for Routine preventive.    Yao Rich MD  [facility] [place] Subjective Soua is a 28 year old, presenting for the following health issues:  Hospital F/U (Peritonsillar abscess surgery)      11/3/2023     9:44 AM   Additional Questions   Roomed by Mohini   Accompanied by self       HPI     Patient had throat pain and fevers and chills start about 10 days ago. On 10/30 she went to the ED and had a peritonsillar abscess drained. She was then sent home with antibitoics. She reutrned on 10/31 due to persisting pain, fever and chills. She was admitted to the hospital and had OR I&D on 11/1 with Dr. Spann (ENT). After the surgery her fever and chills stopped but she continues to have very mild throat pain, swelling, and itchiness. Patient was discharged last night and was able to catch up on sleep. She was tolerating a soft diet before discharge but has not had breakfast yet today. Patient continues to take her antibiotics but has been unable to use her steroids or oxycodone as they were not filled at the pharmacy last night. Patient has follow up with Dr. Spann in 2 weeks.     No fever or chills. Some light headedness with standing. No abdominal pain with eating or otherwise. Using mouthwash instead of brushing teeth due to pain.       Review of Systems   Constitutional, HEENT, cardiovascular, pulmonary, gi and gu systems are negative, except as otherwise noted.      Objective    /87 (BP Location: Left arm, Patient Position: Sitting)   Pulse 81   Ht 1.645 m (5' 4.76\")   Wt 83.9 kg (185 lb)   LMP 10/09/2023 (Exact Date)   SpO2 96%   BMI 31.01 kg/m    Body mass index is 31.01 kg/m .  Physical Exam   GENERAL: healthy, alert and no distress  NECK: no " adenopathy, no asymmetry, masses, or scars  ENT: Oropharynx moist without erythema. Left sided round lesion approximately 2 cm in size with what appears to be granulation tissue (white). No purulent drainage or surrounding erythema.   RESP: lungs clear to auscultation - no rales, rhonchi or wheezes  CV: regular rate and rhythm, normal S1 S2, no murmur, click or rub,  ABDOMEN: soft, nontender, no hepatosplenomegaly, no masses  MS: no gross musculoskeletal defects noted, no edema

## 2023-11-05 LAB — BACTERIA ABSC ANAEROBE+AEROBE CULT: ABNORMAL

## 2023-11-06 ENCOUNTER — PATIENT OUTREACH (OUTPATIENT)
Dept: CARE COORDINATION | Facility: CLINIC | Age: 28
End: 2023-11-06
Payer: COMMERCIAL

## 2023-11-06 ASSESSMENT — ACTIVITIES OF DAILY LIVING (ADL): DEPENDENT_IADLS:: INDEPENDENT

## 2023-11-06 NOTE — PROGRESS NOTES
Clinic Care Coordination Contact  [facility]: Post-Discharge Note  SITUATION                                                      Admission:    Admission Date: 10/31/23   Reason for Admission: Peritonsillar abscess  Active Problems:    Peritoneal abscess (H)  Discharge:   Discharge Date: 11/02/23  Discharge Diagnosis: Peritonsillar abscess  Active Problems:    Peritoneal abscess (H)    BACKGROUND                                                      Per hospital discharge summary and inpatient provider notes:      ASSESSMENT           Discharge Assessment  How are you doing now that you are home?: Pt is doing better  How are your symptoms? (Red Flag symptoms escalate to triage hotline per guidelines): Improved  Do you feel your condition is stable enough to be safe at home until your provider visit?: Yes  Does the patient have their discharge instructions? : Yes  Does the patient have questions regarding their discharge instructions? : No  Were you started on any new medications or were there changes to any of your previous medications? : No  Does the patient have all of their medications?: Yes  Do you have questions regarding any of your medications? : No  Do you have all of your needed medical supplies or equipment (DME)?  (i.e. oxygen tank, CPAP, cane, etc.): No - What equipment or supplies are needed?  Discharge follow-up appointment scheduled within 14 calendar days? : Yes  Discharge Follow Up Appointment Date: 11/13/23  Discharge Follow Up Appointment Scheduled with?: Primary Care Provider                  PLAN                                                      Outpatient Plan:      Future Appointments   Date Time Provider Department Center   11/13/2023  1:40 PM Candace Donaldson MD [facility] [place]   12/28/2023  2:00 PM Yao Rich MD [facility] [place]   1/9/2024  1:20 PM [facility]         For any urgent concerns, please contact our 24 hour nurse triage line: [phone]        ELINOR SerranoW

## 2023-11-07 LAB
BACTERIA ABSC ANAEROBE+AEROBE CULT: ABNORMAL
BACTERIA ABSC ANAEROBE+AEROBE CULT: ABNORMAL

## 2023-11-11 NOTE — PROGRESS NOTES
Assessment & Plan     Peritonsillar abscess  Small flap on left sided tonsil, otherwise no pain or concern for another abscess or infection. It appears she was supposed to follow up with ENT in 2 weeks but got scheduled for 2 months.   - Patient will reach out to ENT, she has their number and is comfortable with this.    Rash  Erythematous maculopapular rash consistent with drug rash from augmentin. No airway compromise and has already finished the augmentin and rash has slightly improved. Was using benadryl but found sedating.   - cetirizine (ZYRTEC) 10 MG tablet; Take 1 tablet (10 mg) by mouth daily  - triamcinolone (KENALOG) 0.1 % external cream; Apply topically 2 times daily  - augmentin added to allergy list    Yeast infection of the vagina  History of yeast infections with clumpy and white discharge. Discussed that this can happen with systemic antibiotic use as it changes the rené of the vagina. Although no yeast on wet prep will treat given convincing symptoms and scenario  - Wet preparation  - fluconazole (DIFLUCAN) 150 MG tablet; Take once. If symptoms not resolved in 7 days take the second dose.      Candace Donaldson MD  [facility]    Montana Garcia is a 28 year old, presenting for the following health issues:  Hospital F/U (Feeling good after surgery, pt feels like when she sneezed she feels like stitches opened.), Medication Problem (Had allergy from antibiotics, developed rash. Pt says a little itchy, no burning sensation. Rash all over body ), and Vaginal Problem (Pt thinks might have yeast infection or ph thrown off because of clumping. Due to antibiotics)      Finished the antibiotics on Saturday and then after Saturday she started to feel itchy and then developed a rash. By Sunday she noticed the rash. The rash is less itchy as she has taken benadryl. She thinks it helps. She was on Augmentin and prednisone for her peritonsillar abscess.     She sneezed while she  was healing and she saw a stitch come out. She thought she was supposed to see ENT in 2 weeks, but it ended up getting scheduled for 2 months.     She has had itchiness and clumpy discharge. She has had yeast infections before and this feels like that.         Objective    /83 (BP Location: Right arm, Patient Position: Sitting, Cuff Size: Adult Regular)   Pulse 74   Wt 83.5 kg (184 lb)   LMP 10/09/2023 (Exact Date)   SpO2 99%   BMI 30.84 kg/m    Body mass index is 30.84 kg/m .  Physical Exam   GENERAL: healthy, alert and no distress  HENT: normal cephalic/atraumatic, oropharynx clear, left sided flap from tonsil noticeable, not erythematous or bleeding and no purulence or drainage  SKIN: erythematous maculopapular rash over trunk and extremities

## 2023-11-13 ENCOUNTER — OFFICE VISIT (OUTPATIENT)
Dept: FAMILY MEDICINE | Facility: CLINIC | Age: 28
End: 2023-11-13
Payer: COMMERCIAL

## 2023-11-13 VITALS
SYSTOLIC BLOOD PRESSURE: 118 MMHG | OXYGEN SATURATION: 99 % | HEART RATE: 74 BPM | WEIGHT: 184 LBS | BODY MASS INDEX: 30.84 KG/M2 | DIASTOLIC BLOOD PRESSURE: 83 MMHG

## 2023-11-13 DIAGNOSIS — R21 RASH: ICD-10-CM

## 2023-11-13 DIAGNOSIS — J36 PERITONSILLAR ABSCESS: Primary | ICD-10-CM

## 2023-11-13 DIAGNOSIS — B37.31 YEAST INFECTION OF THE VAGINA: ICD-10-CM

## 2023-11-13 LAB
CLUE CELLS: ABNORMAL
TRICHOMONAS, WET PREP: ABNORMAL
WBC'S/HIGH POWER FIELD, WET PREP: ABNORMAL
YEAST, WET PREP: ABNORMAL

## 2023-11-13 PROCEDURE — 99214 OFFICE O/P EST MOD 30 MIN: CPT | Performed by: STUDENT IN AN ORGANIZED HEALTH CARE EDUCATION/TRAINING PROGRAM

## 2023-11-13 PROCEDURE — 87210 SMEAR WET MOUNT SALINE/INK: CPT | Performed by: STUDENT IN AN ORGANIZED HEALTH CARE EDUCATION/TRAINING PROGRAM

## 2023-11-13 RX ORDER — TRIAMCINOLONE ACETONIDE 1 MG/G
CREAM TOPICAL 2 TIMES DAILY
Qty: 453 G | Refills: 0 | Status: SHIPPED | OUTPATIENT
Start: 2023-11-13

## 2023-11-13 RX ORDER — FLUCONAZOLE 150 MG/1
TABLET ORAL
Qty: 2 TABLET | Refills: 0 | Status: SHIPPED | OUTPATIENT
Start: 2023-11-13

## 2023-11-13 RX ORDER — CETIRIZINE HYDROCHLORIDE 10 MG/1
10 TABLET ORAL DAILY
Qty: 30 TABLET | Refills: 0 | Status: SHIPPED | OUTPATIENT
Start: 2023-11-13

## 2023-12-26 PROBLEM — E66.9 OBESITY: Status: ACTIVE | Noted: 2023-12-26

## 2023-12-28 ENCOUNTER — OFFICE VISIT (OUTPATIENT)
Dept: FAMILY MEDICINE | Facility: CLINIC | Age: 28
End: 2023-12-28
Payer: COMMERCIAL

## 2023-12-28 VITALS
BODY MASS INDEX: 31.76 KG/M2 | RESPIRATION RATE: 18 BRPM | HEIGHT: 64 IN | HEART RATE: 80 BPM | DIASTOLIC BLOOD PRESSURE: 82 MMHG | WEIGHT: 186 LBS | TEMPERATURE: 98.7 F | OXYGEN SATURATION: 97 % | SYSTOLIC BLOOD PRESSURE: 122 MMHG

## 2023-12-28 DIAGNOSIS — Z30.431 ENCOUNTER FOR ROUTINE CHECKING OF INTRAUTERINE CONTRACEPTIVE DEVICE (IUD): ICD-10-CM

## 2023-12-28 DIAGNOSIS — Z00.00 ROUTINE GENERAL MEDICAL EXAMINATION AT A HEALTH CARE FACILITY: Primary | ICD-10-CM

## 2023-12-28 DIAGNOSIS — E11.9 TYPE 2 DIABETES MELLITUS WITHOUT COMPLICATION, WITHOUT LONG-TERM CURRENT USE OF INSULIN (H): ICD-10-CM

## 2023-12-28 DIAGNOSIS — E66.811 CLASS 1 OBESITY WITHOUT SERIOUS COMORBIDITY IN ADULT, UNSPECIFIED BMI, UNSPECIFIED OBESITY TYPE: ICD-10-CM

## 2023-12-28 LAB
CHOLEST SERPL-MCNC: 241 MG/DL
FASTING STATUS PATIENT QL REPORTED: ABNORMAL
HBA1C MFR BLD: 8.3 % (ref 0–5.6)
HBV SURFACE AB SERPL IA-ACNC: <3.5 M[IU]/ML
HBV SURFACE AB SERPL IA-ACNC: NONREACTIVE M[IU]/ML
HBV SURFACE AG SERPL QL IA: NONREACTIVE
HDLC SERPL-MCNC: 54 MG/DL
HIV 1+2 AB+HIV1 P24 AG SERPL QL IA: NONREACTIVE
LDLC SERPL CALC-MCNC: 159 MG/DL
NONHDLC SERPL-MCNC: 187 MG/DL
TRIGL SERPL-MCNC: 139 MG/DL

## 2023-12-28 PROCEDURE — 87340 HEPATITIS B SURFACE AG IA: CPT

## 2023-12-28 PROCEDURE — G0124 SCREEN C/V THIN LAYER BY MD: HCPCS | Performed by: PATHOLOGY

## 2023-12-28 PROCEDURE — 86706 HEP B SURFACE ANTIBODY: CPT

## 2023-12-28 PROCEDURE — 99395 PREV VISIT EST AGE 18-39: CPT

## 2023-12-28 PROCEDURE — 87491 CHLMYD TRACH DNA AMP PROBE: CPT

## 2023-12-28 PROCEDURE — 36415 COLL VENOUS BLD VENIPUNCTURE: CPT

## 2023-12-28 PROCEDURE — 87591 N.GONORRHOEAE DNA AMP PROB: CPT

## 2023-12-28 PROCEDURE — G0145 SCR C/V CYTO,THINLAYER,RESCR: HCPCS

## 2023-12-28 PROCEDURE — 83036 HEMOGLOBIN GLYCOSYLATED A1C: CPT

## 2023-12-28 PROCEDURE — 87624 HPV HI-RISK TYP POOLED RSLT: CPT

## 2023-12-28 PROCEDURE — 80061 LIPID PANEL: CPT

## 2023-12-28 PROCEDURE — 87389 HIV-1 AG W/HIV-1&-2 AB AG IA: CPT

## 2023-12-28 ASSESSMENT — ENCOUNTER SYMPTOMS
DYSURIA: 0
FREQUENCY: 0
HEMATURIA: 0
SHORTNESS OF BREATH: 0
WEAKNESS: 0
PARESTHESIAS: 0
DIZZINESS: 0
DIARRHEA: 0
EYE PAIN: 0
NERVOUS/ANXIOUS: 0
HEMATOCHEZIA: 0
NAUSEA: 0
SORE THROAT: 0
FEVER: 0
CHILLS: 0
CONSTIPATION: 0
HEARTBURN: 0
ARTHRALGIAS: 0
MYALGIAS: 0
PALPITATIONS: 0
JOINT SWELLING: 0
HEADACHES: 0
ABDOMINAL PAIN: 0
COUGH: 0

## 2023-12-28 NOTE — PROGRESS NOTES
"   SUBJECTIVE:   Radha is a 28 year old, presenting for the following:  Physical (Okay with pap smear, ), IUD placement  (Per Pt seems to be out of place. \" Scratching my boyfriend with open wounds.\" ), and STD (Std check. )        HPI    Throat is doing well. No issues. Eating a normal diet now.     IUD placed 3.5 years ago. New partner who is having cuts and bleeding on his penis from intercourse. He says he can feel the strings. She is having no issues with her IUD personally and has liked it as a form of birth control for the past 3 years. She had irregular periods beforehand.    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 2 years 10 months. Mom and siblings.     Walk dog 3 times a a day - 15 minutes.. Rice protein and veggies. Workout again      Social History     Tobacco Use    Smoking status: Never     Passive exposure: Never    Smokeless tobacco: Never   Substance Use Topics    Alcohol use: Not on file             12/28/2023     1:50 PM   Alcohol Use   Prescreen: >3 drinks/day or >7 drinks/week? No     Reviewed orders with patient.  Reviewed health maintenance and updated orders accordingly - Yes      Breast Cancer Screening: Not indicated at this time.     FHS-7:        No data to display                Pertinent mammograms are reviewed under the imaging tab.    History of abnormal Pap smear: NO - age 21-29 PAP every 3 years recommended     Reviewed and updated as needed this visit by clinical staff   Tobacco  Allergies  Meds              Reviewed and updated as needed this visit by Provider                   Review of Systems  CONSTITUTIONAL: NEGATIVE for fever, chills, change in weight  INTEGUMENTARU/SKIN: NEGATIVE for worrisome rashes, moles or lesions  EYES: NEGATIVE for vision changes or irritation  ENT: NEGATIVE for ear, mouth and throat problems  RESP: NEGATIVE for significant cough or SOB  BREAST: NEGATIVE for masses, tenderness or discharge  CV: NEGATIVE for chest pain, palpitations or " "peripheral edema  GI: NEGATIVE for nausea, abdominal pain, heartburn, or change in bowel habits  : NEGATIVE for unusual urinary or vaginal symptoms. Periods are regular.  MUSCULOSKELETAL: NEGATIVE for significant arthralgias or myalgia  NEURO: NEGATIVE for weakness, dizziness or paresthesias  PSYCHIATRIC: NEGATIVE for changes in mood or affect     OBJECTIVE:   /82   Pulse 80   Temp 98.7  F (37.1  C) (Oral)   Resp 18   Ht 1.63 m (5' 4.17\")   Wt 84.4 kg (186 lb)   LMP 10/09/2023 (Exact Date)   SpO2 97%   BMI 31.75 kg/m    Physical Exam  GENERAL: healthy, alert and no distress  EYES: Eyes grossly normal to inspection, PERRL and conjunctivae and sclerae normal  HENT: ear canals and TM's normal, nose and mouth without ulcers or lesions. Peritonsillar abscess appears to have resolved.   NECK: no adenopathy, no asymmetry, masses, or scars and thyroid normal to palpation  RESP: lungs clear to auscultation - no rales, rhonchi or wheezes  CV: regular rate and rhythm, normal S1 S2, no S3 or S4, no murmur, click or rub, no peripheral edema  GENITALIA: Normal external female genitalia. Speculum exam performed. No cervical motion tenderness. No cervical erythema or purulent discharge. IUD strings in place and about 3-4 cm  ABDOMEN: soft, nontender, no hepatosplenomegaly, no masses   MS: no gross musculoskeletal defects noted, no edema  SKIN: no suspicious lesions or rashes  NEURO: Normal strength and tone, mentation intact and speech normal  PSYCH: mentation appears normal, affect normal/bright    Diagnostic Test Results:  Labs reviewed in Epic  Ordered A1c, lipids, Hep B ag and ab, HIV, Gonorrhea and chlamydia     ASSESSMENT/PLAN:   (Z00.00) Routine general medical examination at a health care facility  (primary encounter diagnosis)  Comment: Patient has been feeling well recently. Lives with her mother and siblings. Has a boyfriend who she is sexually active with and is contemplating conceiving with. She does " not use any tobacco or recreational drugs. Occasional alcohol use. Patient intermittently fasts - eating from 12-8 which is usually two meals and one snack. She walks her dog three times a day briskly for 10-15 minutes. Planning to get back into a regular workout routine. Also planning on reducing carb intake. Pap smear updated today. A1c, lipids, hep B, and STI screening today.   Plan:   - HIV Ag/Ab Screen [facility], Chlamydia trachomatis/Neisseria gonorrhoeae by PCR -  Clinic Collect, Hepatitis B surface antigen,  Hepatitis B Surface Antibody, Gynecologic Cytology (PAP Smear Test)   - Discussed health diet and exercise. Congratulated patient on her current walking habits and encouraged her to pursue the goals she already set for herself: regular workout routine, cutting back on carbs (sugary drinks and rice mainly)    (E66.9) Class 1 obesity without serious comorbidity in adult, unspecified BMI, unspecified obesity type  Comment: BMI 31 with new diagnosis of T2DM. Discussed current eating and exercise habits.   Plan: Hemoglobin A1c, Lipid panel   - Healthy lifestyle changes as above: increase exercise, cut down on sugary beverages and rice    - Patient eats daily vegetables and protein    (E11.9) Type 2 diabetes mellitus without complication, without long-term current use of insulin (H)  Comment: A1c of 8.3 today. First elevated A1c for patient. Her family also has T2DM so she is not overly surprised.  Plan:    - Follow up with me in 1 month for DM follow up: microalbumin, eye exam referral and foot exam   - healthy lifestyle changes as detailed above   - May consider metformin at that time especially if patient is attempting to conceive    (Z30.431) Encounter for routine checking of intrauterine contraceptive device (IUD)  Comment: Boyfriend is having scratches and bleeding during intercourse. This is a new issue for her. On cervical exam, strings are about 3 cm long. Normal cervical exam otherwise.  Plan:    -  "Discussed that although our exam was normal, her and her boyfriend's issues are no less real   - She is going to consider whether she wants a new IUD, a different form of birth control or if she wants to conceive   - She will schedule a visit for IUD removal when she has decided    COUNSELING:  Reviewed preventive health counseling, as reflected in patient instructions  Special attention given to:        Regular exercise       Healthy diet/nutrition       Contraception       Family planning       Folic Acid       Safe sex practices/STD prevention      BMI:   Estimated body mass index is 31.75 kg/m  as calculated from the following:    Height as of this encounter: 1.63 m (5' 4.17\").    Weight as of this encounter: 84.4 kg (186 lb).   Weight management plan: Discussed healthy diet and exercise guidelines      She reports that she has never smoked. She has never been exposed to tobacco smoke. She has never used smokeless tobacco.          Yao Rich MD  [facility]  "

## 2023-12-28 NOTE — PATIENT INSTRUCTIONS

## 2023-12-28 NOTE — PROGRESS NOTES
Faculty Supervision of Residents   I have examined this patient and the medical care has been evaluated and discussed with the resident. See resident note outlining our discussion.    IUD concern, in place on exam but thinking about not wanting to be on LARC anyway. Options discussed. Can remove IUD at any time, with either replacement, other contraception or prenatal vitamin. Patient will consider and schedule follow up.     Candace Donaldson MD

## 2023-12-29 LAB
C TRACH DNA SPEC QL PROBE+SIG AMP: NEGATIVE
N GONORRHOEA DNA SPEC QL NAA+PROBE: NEGATIVE

## 2024-01-03 LAB
BKR LAB AP GYN ADEQUACY: ABNORMAL
BKR LAB AP GYN INTERPRETATION: ABNORMAL
BKR LAB AP HPV REFLEX: ABNORMAL
BKR LAB AP PREVIOUS ABNORMAL: ABNORMAL
PATH REPORT.COMMENTS IMP SPEC: ABNORMAL
PATH REPORT.COMMENTS IMP SPEC: ABNORMAL
PATH REPORT.RELEVANT HX SPEC: ABNORMAL

## 2024-01-05 LAB
HUMAN PAPILLOMA VIRUS 16 DNA: NEGATIVE
HUMAN PAPILLOMA VIRUS 18 DNA: NEGATIVE
HUMAN PAPILLOMA VIRUS FINAL DIAGNOSIS: ABNORMAL
HUMAN PAPILLOMA VIRUS OTHER HR: POSITIVE

## 2024-01-09 ENCOUNTER — OFFICE VISIT (OUTPATIENT)
Dept: OTOLARYNGOLOGY | Facility: CLINIC | Age: 29
End: 2024-01-09

## 2024-01-09 DIAGNOSIS — J36 PERITONSILLAR ABSCESS: Primary | ICD-10-CM

## 2024-01-09 PROCEDURE — 99212 OFFICE O/P EST SF 10 MIN: CPT | Performed by: OTOLARYNGOLOGY

## 2024-01-09 NOTE — LETTER
1/9/2024         RE: Radha Castanon  [address] [place] [address]        Dear Colleague,    Thank you for referring your patient, Radha Castanon, to the [facility]. Please see a copy of my visit note below.    CHIEF COMPLAINT: Patient presents with:  Post-op Visit: DOS 11/01/23, PTA, is able to eat solid foods again, a few days after surgery had a big sneeze and felt stiches pop           HISTORY OF PRESENT ILLNESS    Radha was seen at the behest of Yao Rich MD for peritonsillar abscess.  She is doing well with no concerns.  This episode was her first tonsillar abscess.  No pain.  Tolerating regular diet.                REVIEW OF SYSTEMS    Review of Systems as per HPI and PMHx, otherwise 10 system review system are negative.       ALLERGIES    Augmentin [amoxicillin-pot clavulanate]    CURRENT MEDICATIONS      Current Outpatient Medications:      cetirizine (ZYRTEC) 10 MG tablet, Take 1 tablet (10 mg) by mouth daily (Patient not taking: Reported on 1/9/2024), Disp: 30 tablet, Rfl: 0     fluconazole (DIFLUCAN) 150 MG tablet, Take once. If symptoms not resolved in 7 days take the second dose. (Patient not taking: Reported on 1/9/2024), Disp: 2 tablet, Rfl: 0     methylPREDNISolone (MEDROL DOSEPAK) 4 MG tablet therapy pack, Follow Package Directions (Patient not taking: Reported on 1/9/2024), Disp: 21 tablet, Rfl: 0     oxyCODONE (ROXICODONE) 5 MG/5ML solution, Take 5.5 mLs (5.5 mg) by mouth every 6 hours as needed for moderate to severe pain (Patient not taking: Reported on 1/9/2024), Disp: 75 mL, Rfl: 0     triamcinolone (KENALOG) 0.1 % external cream, Apply topically 2 times daily (Patient not taking: Reported on 1/9/2024), Disp: 453 g, Rfl: 0     PAST MEDICAL HISTORY    PAST MEDICAL HISTORY:   Past Medical History:   Diagnosis Date     Peritonsillar abscess        PAST SURGICAL HISTORY    PAST SURGICAL HISTORY:   Past Surgical History:   Procedure Laterality Date     INCISION AND  DRAINAGE TONSIL, COMBINED Left 11/1/2023    Procedure: INCISION AND DRAINAGE, ABSCESS, LEFT TONSILLAR OR PERITONSILLAR;  Surgeon: Hussein Spann MD;  Location: [facility] Main OR       FAMILY  HISTORY    FAMILY HISTORY:   Family History   Problem Relation Age of Onset     Hypertension Father      Diabetes Father      Cancer No family hx of      Bleeding Disorder No family hx of      Clotting Disorder No family hx of        SOCIAL HISTORY    SOCIAL HISTORY:   Social History     Tobacco Use     Smoking status: Never     Passive exposure: Never     Smokeless tobacco: Never   Substance Use Topics     Alcohol use: Not on file        PHYSICAL EXAM    HEAD: Normal appearance and symmetry:  No cutaneous lesions.      NECK:  supple       EYES:  EOMI    CN VII/XII:  intact           ORAL CAVITY/OROPHARYNX:     Lips:  Normal.  Tongue: normal, midline  Mucosa:   no lesions  Tonsils: 2.5+ cryptic     NECK:  Trachea:  midline.              Thyroid:  normal              Adenopathy:  none        NEURO:   Alert and Oriented     GAIT AND STATION:  normal     RESPIRATORY:   Symmetry and Respiratory effort     PSYCH:  Normal mood and affect     SKIN:   warm and dry         IMPRESSION:    Encounter Diagnosis   Name Primary?     Peritonsillar abscess Yes          RECOMMENDATIONS:    Resolved peritonsillar abscess.   Return as needed.         Again, thank you for allowing me to participate in the care of your patient.        Sincerely,        Hussein Spann MD

## 2024-01-09 NOTE — PROGRESS NOTES
CHIEF COMPLAINT: Patient presents with:  Post-op Visit: DOS 11/01/23, PTA, is able to eat solid foods again, a few days after surgery had a big sneeze and felt stiches pop           HISTORY OF PRESENT ILLNESS    Radha was seen at the behest of Yao Rich MD for peritonsillar abscess.  She is doing well with no concerns.  This episode was her first tonsillar abscess.  No pain.  Tolerating regular diet.                REVIEW OF SYSTEMS    Review of Systems as per HPI and PMHx, otherwise 10 system review system are negative.       ALLERGIES    Augmentin [amoxicillin-pot clavulanate]    CURRENT MEDICATIONS      Current Outpatient Medications:     cetirizine (ZYRTEC) 10 MG tablet, Take 1 tablet (10 mg) by mouth daily (Patient not taking: Reported on 1/9/2024), Disp: 30 tablet, Rfl: 0    fluconazole (DIFLUCAN) 150 MG tablet, Take once. If symptoms not resolved in 7 days take the second dose. (Patient not taking: Reported on 1/9/2024), Disp: 2 tablet, Rfl: 0    methylPREDNISolone (MEDROL DOSEPAK) 4 MG tablet therapy pack, Follow Package Directions (Patient not taking: Reported on 1/9/2024), Disp: 21 tablet, Rfl: 0    oxyCODONE (ROXICODONE) 5 MG/5ML solution, Take 5.5 mLs (5.5 mg) by mouth every 6 hours as needed for moderate to severe pain (Patient not taking: Reported on 1/9/2024), Disp: 75 mL, Rfl: 0    triamcinolone (KENALOG) 0.1 % external cream, Apply topically 2 times daily (Patient not taking: Reported on 1/9/2024), Disp: 453 g, Rfl: 0     PAST MEDICAL HISTORY    PAST MEDICAL HISTORY:   Past Medical History:   Diagnosis Date    Peritonsillar abscess        PAST SURGICAL HISTORY    PAST SURGICAL HISTORY:   Past Surgical History:   Procedure Laterality Date    INCISION AND DRAINAGE TONSIL, COMBINED Left 11/1/2023    Procedure: INCISION AND DRAINAGE, ABSCESS, LEFT TONSILLAR OR PERITONSILLAR;  Surgeon: Hussein Spann MD;  Location: [facility] Main OR       FAMILY  HISTORY    FAMILY HISTORY:   Family History   Problem  Relation Age of Onset    Hypertension Father     Diabetes Father     Cancer No family hx of     Bleeding Disorder No family hx of     Clotting Disorder No family hx of        SOCIAL HISTORY    SOCIAL HISTORY:   Social History     Tobacco Use    Smoking status: Never     Passive exposure: Never    Smokeless tobacco: Never   Substance Use Topics    Alcohol use: Not on file        PHYSICAL EXAM    HEAD: Normal appearance and symmetry:  No cutaneous lesions.      NECK:  supple       EYES:  EOMI    CN VII/XII:  intact           ORAL CAVITY/OROPHARYNX:     Lips:  Normal.  Tongue: normal, midline  Mucosa:   no lesions  Tonsils: 2.5+ cryptic     NECK:  Trachea:  midline.              Thyroid:  normal              Adenopathy:  none        NEURO:   Alert and Oriented     GAIT AND STATION:  normal     RESPIRATORY:   Symmetry and Respiratory effort     PSYCH:  Normal mood and affect     SKIN:   warm and dry         IMPRESSION:    Encounter Diagnosis   Name Primary?    Peritonsillar abscess Yes          RECOMMENDATIONS:    Resolved peritonsillar abscess.   Return as needed.

## 2024-01-26 ENCOUNTER — DOCUMENTATION ONLY (OUTPATIENT)
Dept: CARE COORDINATION | Facility: CLINIC | Age: 29
End: 2024-01-26

## 2024-01-26 ENCOUNTER — OFFICE VISIT (OUTPATIENT)
Dept: FAMILY MEDICINE | Facility: CLINIC | Age: 29
End: 2024-01-26

## 2024-01-26 VITALS
BODY MASS INDEX: 30.84 KG/M2 | SYSTOLIC BLOOD PRESSURE: 116 MMHG | DIASTOLIC BLOOD PRESSURE: 82 MMHG | TEMPERATURE: 98.8 F | HEART RATE: 90 BPM | HEIGHT: 65 IN | OXYGEN SATURATION: 96 % | WEIGHT: 185.08 LBS | RESPIRATION RATE: 22 BRPM

## 2024-01-26 DIAGNOSIS — R87.612 LOW GRADE SQUAMOUS INTRAEPITHELIAL LESION ON CYTOLOGIC SMEAR OF CERVIX (LGSIL): Primary | ICD-10-CM

## 2024-01-26 DIAGNOSIS — Z13.9 SCREENING FOR CONDITION: ICD-10-CM

## 2024-01-26 DIAGNOSIS — Z59.71 DOES NOT HAVE HEALTH INSURANCE: Primary | ICD-10-CM

## 2024-01-26 LAB — HCG UR QL: NEGATIVE

## 2024-01-26 PROCEDURE — 88342 IMHCHEM/IMCYTCHM 1ST ANTB: CPT | Performed by: PATHOLOGY

## 2024-01-26 PROCEDURE — 88305 TISSUE EXAM BY PATHOLOGIST: CPT | Performed by: PATHOLOGY

## 2024-01-26 PROCEDURE — 57455 BIOPSY OF CERVIX W/SCOPE: CPT | Mod: GC

## 2024-01-26 PROCEDURE — 81025 URINE PREGNANCY TEST: CPT

## 2024-01-26 NOTE — PATIENT INSTRUCTIONS
WHAT TO DO AFTER YOUR COLPOSCOPY, ENDOCERVICAL CURETTAGE, CERVICAL   BIOPSY OR ENDOMETRIAL BIOPSY    Use only pads, no tampons.  DO NOT have sex for seven (7) days or until the bleeding stops.  DO NOT put anything in you vagina.  DO NOT douche for seven (7) days.  If you are bleeding heavier than you would for a normal period , call your clinic.  You had a biopsy, take it easy for the rest of the day-----stay off your feet.    ~~~~~~~~~~~~~~~~~~~~~~~~~~~~~~~~~~~~~~~~~~~~~~~~~~~~~~~~~~~~~~~~~~~~~~~~~  Call immediately if you notice any of these happening:    Abdominal pain  Fever over 100 degrees F or 37 degrees C  Any foul smelling vaginal discharge  Heavy vaginal bleeding (soaking one pad per hour)    ~~~~~~~~~~~~~~~~~~~~~~~~~~~~~~~~~~~~~~~~~~~~~~~~~~~~~~~~~~~~~~~~~~~~~~~~~  Please call the clinic with your concerns. The phones are answered between 8:00 am and 5:00 pm. The clinic is open for appointments from 8:00 am to 5:00 pm. If you have a concern after hours, call the clinic number and the answering service will have a provider call you.

## 2024-01-26 NOTE — PROGRESS NOTES
Colposcopy Procedure Note    History:  Radha Castanon is a patient of Yao Foster that  presents for colposcopy for LSIL with other HR HPV  STI history: None  Contraception  Mirena IUD  Smoking?  No  Taking folate?   No  ASA in last week? No  NSAID taken today? No    Consent: Affirmation of informed consent signed and scanned into medical record. Risks, benefits and alternatives discussed. Patient's questions were elicited and answered.  Procedure safety checklist was completed:  Yes  Time Out (Pause for the Cause) completed: Yes    Labs:   UPT:  Negative    Procedure:  Patient positioned for colposcopy. Acetic acid applied. Lugol's applied.   SCJ seen entirely? Yes  [place] was satisfactory with biopsy  cervix swabbed with Lugol's solution, endocervical speculum placed, SCJ visualized - lesion at 11 o'clock, cervical biopsies taken at 11 o'clock, specimen labelled and sent to pathology, and hemostasis achieved with silver nitrate  Bleeding controlled with: with silver nitrate  EBL: minimal      Findings:       Vagina   normal without visible lesions    Vulva  vulvar colposcopy not performed    Cervix  Color:  One (1) Points - Shiny, off-white.  Intermediate white.  Margin: One (1) Points - Regular lesions with smooth, straight outlines.  Sharp peripher   Vessels: Zero (0) Points - Fine, punctuation or fine mosaic.  Uniform, fine caliber, nondilated capillary loops.  Narrow intercapillary distance.  Lugol's: One (1) Point - Parital iodine uptake.  Variegated, tortoise-shell appearance.  Total Pts: 3      (0-2 mild, 3-5 mod, 6-8 severe)     Colposcopic Impression: Dysplasia Moderate    Tolerance:   Patient tolerated procedure well    Plan:  Will call for biopsy results.  Gardasil vaccine administered - due for 3rd vaccine  Discharge instructions discussed including RTC or call if bleeding >1pph, fever, abdominal pain or foul smelling discharge.       Resident: Britni Sosa MD  Faculty: Dr. Helen France present for  and supervised this entire procedure

## 2024-01-26 NOTE — CONFIDENTIAL NOTE
SW submitted FRW referral for lack of health insurance coverage this date.    HEVER RUIZ, SUSANNASW, [facility]

## 2024-01-29 ENCOUNTER — PATIENT OUTREACH (OUTPATIENT)
Dept: CARE COORDINATION | Facility: CLINIC | Age: 29
End: 2024-01-29

## 2024-01-31 ENCOUNTER — OFFICE VISIT (OUTPATIENT)
Dept: FAMILY MEDICINE | Facility: CLINIC | Age: 29
End: 2024-01-31

## 2024-01-31 VITALS
SYSTOLIC BLOOD PRESSURE: 125 MMHG | BODY MASS INDEX: 31.92 KG/M2 | RESPIRATION RATE: 19 BRPM | HEIGHT: 64 IN | HEART RATE: 80 BPM | WEIGHT: 187 LBS | OXYGEN SATURATION: 100 % | DIASTOLIC BLOOD PRESSURE: 89 MMHG | TEMPERATURE: 98 F

## 2024-01-31 DIAGNOSIS — E11.9 TYPE 2 DIABETES MELLITUS WITHOUT COMPLICATION, WITHOUT LONG-TERM CURRENT USE OF INSULIN (H): Primary | ICD-10-CM

## 2024-01-31 DIAGNOSIS — R03.0 ELEVATED BLOOD PRESSURE READING WITHOUT DIAGNOSIS OF HYPERTENSION: ICD-10-CM

## 2024-01-31 DIAGNOSIS — E78.5 HYPERLIPIDEMIA LDL GOAL <100: ICD-10-CM

## 2024-01-31 LAB
CREAT UR-MCNC: 16.7 MG/DL
MICROALBUMIN UR-MCNC: <12 MG/L
MICROALBUMIN/CREAT UR: NORMAL MG/G{CREAT}

## 2024-01-31 PROCEDURE — 90471 IMMUNIZATION ADMIN: CPT

## 2024-01-31 PROCEDURE — 90715 TDAP VACCINE 7 YRS/> IM: CPT

## 2024-01-31 PROCEDURE — 82570 ASSAY OF URINE CREATININE: CPT

## 2024-01-31 PROCEDURE — 82043 UR ALBUMIN QUANTITATIVE: CPT

## 2024-01-31 PROCEDURE — 99207 PR FOOT EXAM NO CHARGE: CPT

## 2024-01-31 PROCEDURE — 99213 OFFICE O/P EST LOW 20 MIN: CPT | Mod: 25

## 2024-01-31 NOTE — PROGRESS NOTES
Assessment & Plan     Going to [place] with boyfriend in late March.     (E11.9) Type 2 diabetes mellitus without complication, without long-term current use of insulin (H)  (primary encounter diagnosis)  Comment: A1c of 8.3 12/23. Denies polyuria, polydipsia, numbness and tingling of extremities. Works 40 hours a week - sedentary. Has not started exercise. Did make changes to cut down on rice consumption and sugary treats, however, had a set back in the last week surrounding birthdays. Encouraged patient that these are lifestyles changes and set backs are okay, we are trying to make permanent changes to her lifestyle which takes time. Patient would like to continue to attempt lifestyle changes before starting a medication and I believe that is reasonable. Normal diabetic foot exam. Verbal referral to go back to regular optometrist and inform them she needs a diabetic eye exam.  Plan:   - Albumin Random Urine Quantitative with Creat Ratio  - Complete diabetic eye exam  - Continue with lifestyle changes  - Follow up in 2 months for repeat Lipid panel and A1c  - If we are moving the right direction, may continue with lifestyle changes  - Otherwise will consider starting metformin and statin (if not attempting conception)             (R03.0) Elevated blood pressure reading without diagnosis of hypertension  Comment: 138/100, recheck normal 125/80. Suspect patient has a component of white coat hypertension  Plan:    - Will recheck in 2 months at follow up   - If she does end up needed medication: will consider nifedipine or labeteolol in setting of potential goal of conception    (E78.5) Hyperlipidemia LDL goal <100  Comment: Elevated total and LDL cholesterol.   Plan:    - Will not treat with statin at this time as patient contemplating conception   - Otherwise, would likely benefit from medium strength statin    Return in about 2 months (around 3/31/2024).      Montana Garcia is a 28 year old, presenting for the  "following health issues:  RECHECK (Colposcopy and follow up)        1/31/2024     1:21 PM   Additional Questions   Roomed by Kya   Accompanied by ERIC ARREOLA         Patient feeling well since last visit. Anxiously awaiting colposcopy results. Contemplating IUD vs conceiving - awaiting results first.    [facility]    Eye doctor: Few years ago.     Father and grandmother with HTN -   Father: heart complications - brugada syndrome     Works 40 hours a week - sedentary. Exercise - not making changes. Sweet snack person.           Objective    /89 (BP Location: Right arm, Patient Position: Sitting, Cuff Size: Adult Regular)   Pulse 80   Temp 98  F (36.7  C) (Oral)   Resp 19   Ht 1.626 m (5' 4\")   Wt 84.8 kg (187 lb)   LMP 01/19/2024   SpO2 100%   BMI 32.10 kg/m    Body mass index is 32.1 kg/m .  Physical Exam   GENERAL: Healthy, alert and no distress  EYES: Eyes grossly normal to inspection.  No discharge or erythema, or obvious scleral/conjunctival abnormalities.  RESP:  Lungs clear throughout. No wheeze or crackles.   CV: Heart RRR. No murmur  MSK: No gross deformity. Normal tone.  SKIN: Visible skin clear. No significant rash, abnormal pigmentation or lesions.  NEURO: Cranial nerves grossly intact.  Mentation and speech appropriate for age. Diabetic foot exam normal.   PSYCH: Mentation appears normal, affect normal/bright, judgement and insight intact, normal speech and appearance well-groomed.            Signed Electronically by: Yao Rich MD    "

## 2024-01-31 NOTE — PATIENT INSTRUCTIONS
Continue with lifestyle changes - try to do some light exercise 3 times weekly if possible. Continue to decrease rice and sugary treat intake.    Follow up in 2 months.

## 2024-02-01 NOTE — PROGRESS NOTES
Prior to immunization administration, verified patients identity using patient s name and date of birth. Please see Immunization Activity for additional information.     Given Tdap given    Screening Questionnaire for Adult Immunization    Are you sick today?   No   Do you have allergies to medications, food, a vaccine component or latex?   No   Have you ever had a serious reaction after receiving a vaccination?   No   Do you have a long-term health problem with heart, lung, kidney, or metabolic disease (e.g., diabetes), asthma, a blood disorder, no spleen, complement component deficiency, a cochlear implant, or a spinal fluid leak?  Are you on long-term aspirin therapy?   No   Do you have cancer, leukemia, HIV/AIDS, or any other immune system problem?   No   Do you have a parent, brother, or sister with an immune system problem?   No   In the past 3 months, have you taken medications that affect  your immune system, such as prednisone, other steroids, or anticancer drugs; drugs for the treatment of rheumatoid arthritis, Crohn s disease, or psoriasis; or have you had radiation treatments?   No   Have you had a seizure, or a brain or other nervous system problem?   No   During the past year, have you received a transfusion of blood or blood    products, or been given immune (gamma) globulin or antiviral drug?   No   For women: Are you pregnant or is there a chance you could become       pregnant during the next month?   No   Have you received any vaccinations in the past 4 weeks?   No     Immunization questionnaire answers were all negative.      Patient instructed to remain in clinic for 15 minutes afterwards, and to report any adverse reactions.     Screening performed by Carey Guerrero MA on 2/1/2024 at 4:47 PM.

## 2024-02-05 ENCOUNTER — PATIENT OUTREACH (OUTPATIENT)
Dept: CARE COORDINATION | Facility: CLINIC | Age: 29
End: 2024-02-05

## 2024-02-05 LAB
PATH REPORT.COMMENTS IMP SPEC: NORMAL
PATH REPORT.COMMENTS IMP SPEC: NORMAL
PATH REPORT.FINAL DX SPEC: NORMAL
PATH REPORT.GROSS SPEC: NORMAL
PATH REPORT.MICROSCOPIC SPEC OTHER STN: NORMAL
PATH REPORT.RELEVANT HX SPEC: NORMAL
PHOTO IMAGE: NORMAL

## 2024-02-06 PROBLEM — N87.0 DYSPLASIA OF CERVIX, LOW GRADE (CIN 1): Status: ACTIVE | Noted: 2024-02-06

## 2024-02-07 NOTE — PROGRESS NOTES
Preceptor Attestation:   Patient seen, evaluated and discussed with the resident. I have verified the content of the note, which accurately reflects my assessment of the patient and the plan of care.    Supervising Physician:Alessio Garcia MD    [facility]

## 2024-03-11 ENCOUNTER — PATIENT OUTREACH (OUTPATIENT)
Dept: CARE COORDINATION | Facility: CLINIC | Age: 29
End: 2024-03-11

## 2024-04-12 ENCOUNTER — PATIENT OUTREACH (OUTPATIENT)
Dept: CARE COORDINATION | Facility: CLINIC | Age: 29
End: 2024-04-12

## 2024-05-13 ENCOUNTER — OFFICE VISIT (OUTPATIENT)
Dept: FAMILY MEDICINE | Facility: CLINIC | Age: 29
End: 2024-05-13

## 2024-05-13 VITALS
HEIGHT: 64 IN | TEMPERATURE: 98.3 F | SYSTOLIC BLOOD PRESSURE: 126 MMHG | OXYGEN SATURATION: 98 % | WEIGHT: 195.08 LBS | BODY MASS INDEX: 33.31 KG/M2 | RESPIRATION RATE: 18 BRPM | DIASTOLIC BLOOD PRESSURE: 86 MMHG | HEART RATE: 73 BPM

## 2024-05-13 DIAGNOSIS — Z30.432 ENCOUNTER FOR IUD REMOVAL: Primary | ICD-10-CM

## 2024-05-13 DIAGNOSIS — Z11.59 NEED FOR HEPATITIS C SCREENING TEST: ICD-10-CM

## 2024-05-13 DIAGNOSIS — Z30.432 ENCOUNTER FOR REMOVAL OF INTRAUTERINE CONTRACEPTIVE DEVICE: ICD-10-CM

## 2024-05-13 DIAGNOSIS — E11.9 TYPE 2 DIABETES MELLITUS WITHOUT COMPLICATION, WITHOUT LONG-TERM CURRENT USE OF INSULIN (H): ICD-10-CM

## 2024-05-13 LAB — HBA1C MFR BLD: 9.3 % (ref 0–5.6)

## 2024-05-13 PROCEDURE — 83036 HEMOGLOBIN GLYCOSYLATED A1C: CPT

## 2024-05-13 PROCEDURE — 36415 COLL VENOUS BLD VENIPUNCTURE: CPT

## 2024-05-13 PROCEDURE — 58301 REMOVE INTRAUTERINE DEVICE: CPT | Mod: GC

## 2024-05-13 PROCEDURE — 99213 OFFICE O/P EST LOW 20 MIN: CPT | Mod: 25

## 2024-05-13 PROCEDURE — 86803 HEPATITIS C AB TEST: CPT

## 2024-05-13 PROCEDURE — 80061 LIPID PANEL: CPT

## 2024-05-13 NOTE — PROGRESS NOTES
Preceptor Attestation:   Patient seen, evaluated and discussed with the resident. I was present for and supervised the entire procedure. I have verified the content of the note, which accurately reflects my assessment of the patient and the plan of care.  Supervising Physician:Radha Medina MD  [facility]

## 2024-05-13 NOTE — PROGRESS NOTES
"  Assessment & Plan     (Z30.432) Encounter for IUD removal  (primary encounter diagnosis)  Comment: Patient has had a mirena for 3 years. Just finished her menses. Will use barrier protection for contraception but she is considering having a baby in the coming years.   Procedure note:    Patient requested that IUD be removed.  Patient was placed in the dorsal lithotomy position.  Speculum used to locate cervix.  IUD strings seen exiting through cervix.  Clamp used to secure strings and gentle traction used to remove IUD.  Patient tolerated procedure well.  No significant bleeding noted.     Yao Rich MD  5/13/2024  1:58 PM    (E11.9) Type 2 diabetes mellitus without complication, without long-term current use of insulin (H)  Comment:   Hemoglobin A1C   Date Value Ref Range Status   12/28/2023 8.3 (H) 0.0 - 5.6 % Final   Last A1c of 8.3 on establish of care. Trialing lifestyle modifications first. Due for repeat A1c and lipid panel to evaluate change in past 3-5 months.   Plan:   - Lipid panel and A1c  - Will discuss results and recommendations at follow up visit this week            (Z11.59) Need for hepatitis C screening test  Plan: Hepatitis C Screen Reflex to HCV RNA Quant and         Genotype      No follow-ups on file.    Montana Garcia is a 28 year old, presenting for the following health issues:  iud removal    HPI           Objective    Ht 1.635 m (5' 4.37\")   Wt 88.5 kg (195 lb 1.3 oz)   BMI 33.10 kg/m    Body mass index is 33.1 kg/m .  Physical Exam   GENERAL: Healthy, alert and no distress  EYES: Eyes grossly normal to inspection.  No discharge or erythema, or obvious scleral/conjunctival abnormalities.  RESP:  Breathing on room air without difficulty  CV: Extremities appear well perfused.   SKIN: Visible skin clear. No significant rash, abnormal pigmentation or lesions.  PELVIC: Speculum exam performed. IUD strings visualized and IUD removed. Patient does have non-odorous white discharge " present throughout vaginal vault and on cervix.  NEURO: Cranial nerves grossly intact.  Mentation and speech appropriate for age.  PSYCH: Mentation appears normal, affect normal/bright, judgement and insight intact, normal speech and appearance well-groomed.          Signed Electronically by: Yao Rich MD

## 2024-05-14 LAB
CHOLEST SERPL-MCNC: 208 MG/DL
FASTING STATUS PATIENT QL REPORTED: ABNORMAL
HCV AB SERPL QL IA: NONREACTIVE
HDLC SERPL-MCNC: 43 MG/DL
LDLC SERPL CALC-MCNC: 142 MG/DL
NONHDLC SERPL-MCNC: 165 MG/DL
TRIGL SERPL-MCNC: 116 MG/DL

## 2024-05-16 ENCOUNTER — OFFICE VISIT (OUTPATIENT)
Dept: FAMILY MEDICINE | Facility: CLINIC | Age: 29
End: 2024-05-16

## 2024-05-16 ENCOUNTER — DOCUMENTATION ONLY (OUTPATIENT)
Dept: CARE COORDINATION | Facility: CLINIC | Age: 29
End: 2024-05-16

## 2024-05-16 VITALS
BODY MASS INDEX: 32.92 KG/M2 | WEIGHT: 194 LBS | HEART RATE: 79 BPM | DIASTOLIC BLOOD PRESSURE: 84 MMHG | SYSTOLIC BLOOD PRESSURE: 126 MMHG | OXYGEN SATURATION: 96 % | RESPIRATION RATE: 18 BRPM

## 2024-05-16 DIAGNOSIS — Z59.71 DOES NOT HAVE HEALTH INSURANCE: Primary | ICD-10-CM

## 2024-05-16 DIAGNOSIS — E11.9 TYPE 2 DIABETES MELLITUS WITHOUT COMPLICATION, WITHOUT LONG-TERM CURRENT USE OF INSULIN (H): Primary | ICD-10-CM

## 2024-05-16 PROCEDURE — G2211 COMPLEX E/M VISIT ADD ON: HCPCS

## 2024-05-16 PROCEDURE — 99214 OFFICE O/P EST MOD 30 MIN: CPT | Mod: GC

## 2024-05-16 RX ORDER — METFORMIN HCL 500 MG
500 TABLET, EXTENDED RELEASE 24 HR ORAL
Qty: 120 TABLET | Refills: 1 | Status: SHIPPED | OUTPATIENT
Start: 2024-05-16 | End: 2024-06-13

## 2024-05-16 NOTE — PROGRESS NOTES
Assessment & Plan     (E11.9) Type 2 diabetes mellitus without complication, without long-term current use of insulin (H)  (primary encounter diagnosis)  Comment: Patient with A1c of 9.3 a few days ago which is up from 8.3 4 months ago.  Patient is unsure if she is going to try and conceive soon.  She is open to starting metformin but has some reservations about starting insulin at this time.  Will start metformin and titrate up to the max dose over 4 weeks and then have follow-up in 4 weeks.  She will speak to her partner in the meantime to decide if they are going to be conceiving earlier versus later.  This is important as if there can await for a while we could consider other oral agents on top of metformin and avoid insulin at this time but if she is going to conceive in the near future starting insulin rather than other oral agents would be optimal.  Plan: metFORMIN (GLUCOPHAGE XR) 500 MG 24 hr tablet   -Start with 500 mg ER with meals for 1 week then increase to 1000 for the next week, then increase to 1500 for a week, then increase to 2000 for a week.   -Reviewed side effects with metformin mainly being upset stomach and potential diarrhea.   -She should take metformin with meals to help prevent side effects   -Reviewed that she is if she is having side effects that she can slowly increase rather than increase at a weekly basis or drop down to previous dose for a couple more days before going back up   -Follow-up with me in 4 weeks at which point we will repeat A1c test   -Not prescribing a statin at this time in the setting of potential conception    The longitudinal plan of care for the diagnosis(es)/condition(s) as documented were addressed during this visit. Due to the added complexity in care, I will continue to support Radha in the subsequent management and with ongoing continuity of care.            Return in about 4 weeks (around 6/13/2024) for with me T2DM.    Subjective   Radha is a 28 year old,  presenting for the following health issues:  Diabetes (Following up, per patient no concerns. )      5/16/2024     2:54 PM   Additional Questions   Roomed by Ety   Accompanied by Self         5/16/2024    Information    services provided? No     HPI     Patient is feeling well.  No concerns today.          Objective    /84 (BP Location: Right arm, Patient Position: Sitting, Cuff Size: Adult Regular)   Pulse 79   Resp 18   Wt 88 kg (194 lb)   LMP 04/23/2024   SpO2 96%   BMI 32.92 kg/m    Body mass index is 32.92 kg/m .  Physical Exam   GENERAL: Healthy, alert and no distress  EYES: Eyes grossly normal to inspection.  No discharge or erythema, or obvious scleral/conjunctival abnormalities.  RESP:  Lungs clear throughout. No wheeze or crackles.   CV: Heart RRR. No murmur  NEURO: Cranial nerves grossly intact.  Mentation and speech appropriate for age.  PSYCH: Mentation appears normal, affect normal/bright, judgement and insight intact, normal speech and appearance well-groomed.      No results found for any visits on 05/16/24.        Signed Electronically by: Yao Rich MD

## 2024-05-16 NOTE — CONFIDENTIAL NOTE
YAYO referred patient to [facility] FRW team again this date as patient is scheduled for 3pm at [facility] and does not have health insurance. Patient to be reminded at appointment this date to answer phone calls to speak to FRW team.    HEVER RUIZ, SHANNAN, [facility]

## 2024-05-16 NOTE — PROGRESS NOTES
Preceptor Attestation:   Patient seen, evaluated and discussed with the resident. I have verified the content of the note, which accurately reflects my assessment of the patient and the plan of care.    Supervising Physician:Elizabeth Carty MD    [facility]

## 2024-05-16 NOTE — PATIENT INSTRUCTIONS
Starting metformin ER:     Start 500 mg once daily for 1 week  1000 mg once daily for 1 week  1500 mg once daily for 1 week  2000 mg once daily for 1 week     Side effects: Diarrhea, upset stomach.

## 2024-05-17 ENCOUNTER — PATIENT OUTREACH (OUTPATIENT)
Dept: CARE COORDINATION | Facility: CLINIC | Age: 29
End: 2024-05-17

## 2024-06-13 ENCOUNTER — OFFICE VISIT (OUTPATIENT)
Dept: FAMILY MEDICINE | Facility: CLINIC | Age: 29
End: 2024-06-13

## 2024-06-13 VITALS
TEMPERATURE: 97.6 F | HEART RATE: 80 BPM | HEIGHT: 64 IN | SYSTOLIC BLOOD PRESSURE: 117 MMHG | BODY MASS INDEX: 31.95 KG/M2 | RESPIRATION RATE: 18 BRPM | DIASTOLIC BLOOD PRESSURE: 88 MMHG | WEIGHT: 187.12 LBS | OXYGEN SATURATION: 96 %

## 2024-06-13 DIAGNOSIS — Z31.69 ENCOUNTER FOR PRECONCEPTION CONSULTATION: ICD-10-CM

## 2024-06-13 DIAGNOSIS — E11.9 TYPE 2 DIABETES MELLITUS WITHOUT COMPLICATION, WITHOUT LONG-TERM CURRENT USE OF INSULIN (H): Primary | ICD-10-CM

## 2024-06-13 PROCEDURE — 99213 OFFICE O/P EST LOW 20 MIN: CPT | Mod: GC

## 2024-06-13 RX ORDER — PRENATAL VIT/IRON FUM/FOLIC AC 27MG-0.8MG
1 TABLET ORAL DAILY
Qty: 90 TABLET | Refills: 3 | Status: SHIPPED | OUTPATIENT
Start: 2024-06-13

## 2024-06-13 RX ORDER — LANCETS
EACH MISCELLANEOUS
Qty: 100 EACH | Refills: 6 | Status: SHIPPED | OUTPATIENT
Start: 2024-06-13

## 2024-06-13 RX ORDER — METFORMIN HYDROCHLORIDE 1000 MG/1
2000 TABLET, FILM COATED, EXTENDED RELEASE ORAL
Qty: 90 TABLET | Refills: 1 | Status: SHIPPED | OUTPATIENT
Start: 2024-06-13

## 2024-06-13 NOTE — PROGRESS NOTES
Assessment & Plan     (E11.9) Type 2 diabetes mellitus without complication, without long-term current use of insulin (H)  (primary encounter diagnosis)  Comment:  Patient with A1c of 9.3 1 month ago which is up from 8.3 5 months ago.  Patient is going to try and conceive soon. Mild diarrhea with metformin initially that is now resolved. Tolerating 2000 mg daily well. No hypoglycemia symptoms. Continuing to make healthy lifestyle changes. Discussed conceiving and potential for insulin during pregnancy. Eye visit scheduled next visit. Sent in glucose testing supplies to check 1-2 times daily. Follow up in 2-4 weeks  Plan: blood glucose monitoring (NO BRAND SPECIFIED)         meter device kit, blood glucose (NO BRAND         SPECIFIED) test strip, thin (NO BRAND         SPECIFIED) lancets, metFORMIN modified         (GLUMETZA) 1000 MG 24 hr tablet        - test blood sugars 1-2 times daily, fasting every day   - follow up in 2-4 weeks   - Keep eye doctor visit next week   - Discussed potential need for insulin during pregnancy   - Keep up healthy lifestyle and dietary changes    (Z31.69) Encounter for preconception consultation  Comment: patient is going to try to conceive. Order for prenatal put in.   Plan: Prenatal Vit-Fe Fumarate-FA (PRENATAL         MULTIVITAMIN W/IRON) 27-0.8 MG tablet                      Return in about 4 weeks (around 7/11/2024) for with me, Follow up.     Patient with A1c of 9.3 a few days ago which is up from 8.3 4 months ago.  Patient is unsure if she is going to try and conceive soon.  She is open to starting metformin but has some reservations about starting insulin at this time.  Will start metformin and titrate up to the max dose over 4 weeks and then have follow-up in 4 weeks.  She will speak to her partner in the meantime to decide if they are going to be conceiving earlier versus later.  This is important as if there can await for a while we could consider other oral agents on top  "of metformin and avoid insulin at this time but if she is going to conceive in the near future starting insulin rather than other oral agents would be optimal     Conception: Prenatal multivitamin.     Sugar checks at home none.     No hypoglycemia symptoms.     -Start with 500 mg ER with meals for 1 week then increase to 1000 for the next week, then increase to 1500 for a week, then increase to 2000 for a week.              -Reviewed side effects with metformin mainly being upset stomach and potential diarrhea.              -She should take metformin with meals to help prevent side effects              -Reviewed that she is if she is having side effects that she can slowly increase rather than increase at a weekly basis or drop down to previous dose for a couple more days before going back up              -Follow-up with me in 4 weeks at which point we will repeat A1c test              -Not prescribing a statin at this time in the setting of potential conception    Montana Garcia is a 28 year old, presenting for the following health issues:  Diabetes    HPI     2000 mg metformin once daily - morning     Diarhhea that reoslved.     No children. Going to try. Partner Enrique - boyfriend/girlfriend - hopefully engaged.     Eye doctor on friday        Objective    /88 (BP Location: Right arm, Patient Position: Sitting, Cuff Size: Adult Regular)   Pulse 80   Temp 97.6  F (36.4  C)   Resp 18   Ht 1.633 m (5' 4.29\")   Wt 84.9 kg (187 lb 1.9 oz)   LMP 05/25/2024   SpO2 96%   BMI 31.83 kg/m    Body mass index is 31.83 kg/m .  Physical Exam   GENERAL: Healthy, alert and no distress  EYES: Eyes grossly normal to inspection.  No discharge or erythema, or obvious scleral/conjunctival abnormalities.  RESP:  Lungs clear throughout. No wheeze or crackles.   CV: Heart RRR. No murmur  NEURO: Cranial nerves grossly intact.  Mentation and speech appropriate for age.  PSYCH: Mentation appears normal, affect " normal/bright, judgement and insight intact, normal speech and appearance well-groomed.              Signed Electronically by: Yao Rich MD

## 2024-06-17 DIAGNOSIS — E11.9 TYPE 2 DIABETES MELLITUS WITHOUT COMPLICATION, WITHOUT LONG-TERM CURRENT USE OF INSULIN (H): ICD-10-CM

## 2024-06-17 RX ORDER — METFORMIN HYDROCHLORIDE 1000 MG/1
2000 TABLET, FILM COATED, EXTENDED RELEASE ORAL
Status: CANCELLED | OUTPATIENT
Start: 2024-06-17

## 2024-06-17 RX ORDER — METFORMIN HYDROCHLORIDE EXTENDED-RELEASE TABLETS 1000 MG/1
2000 TABLET, FILM COATED, EXTENDED RELEASE ORAL
Qty: 60 TABLET | Refills: 2 | Status: SHIPPED | OUTPATIENT
Start: 2024-06-17

## 2024-06-18 DIAGNOSIS — E11.9 TYPE 2 DIABETES MELLITUS WITHOUT COMPLICATION, WITHOUT LONG-TERM CURRENT USE OF INSULIN (H): ICD-10-CM

## 2024-06-18 RX ORDER — METFORMIN HYDROCHLORIDE EXTENDED-RELEASE TABLETS 1000 MG/1
2000 TABLET, FILM COATED, EXTENDED RELEASE ORAL
Qty: 60 TABLET | Status: CANCELLED | OUTPATIENT
Start: 2024-06-18

## 2024-06-19 RX ORDER — METFORMIN HCL 500 MG
2000 TABLET, EXTENDED RELEASE 24 HR ORAL
Qty: 120 TABLET | Refills: 3 | Status: SHIPPED | OUTPATIENT
Start: 2024-06-19

## 2024-06-21 LAB — RETINOPATHY: NORMAL

## 2024-10-05 ENCOUNTER — HEALTH MAINTENANCE LETTER (OUTPATIENT)
Age: 29
End: 2024-10-05

## 2025-01-19 ENCOUNTER — HEALTH MAINTENANCE LETTER (OUTPATIENT)
Age: 30
End: 2025-01-19

## 2025-02-16 ENCOUNTER — HEALTH MAINTENANCE LETTER (OUTPATIENT)
Age: 30
End: 2025-02-16

## 2025-03-26 ENCOUNTER — PATIENT OUTREACH (OUTPATIENT)
Dept: FAMILY MEDICINE | Facility: CLINIC | Age: 30
End: 2025-03-26

## 2025-03-26 NOTE — TELEPHONE ENCOUNTER
Patient Quality Outreach    Patient is due for the following:   Diabetes -  A1C    Action(s) Taken:   Calling patient but patient not available.    Type of outreach:    Phone, left message for patient/parent to call back.    Questions for provider review:    None         Navjot Muniz CMA  Chart routed to Care Team.

## 2025-04-27 ENCOUNTER — HEALTH MAINTENANCE LETTER (OUTPATIENT)
Age: 30
End: 2025-04-27

## 2025-05-22 ENCOUNTER — TELEPHONE (OUTPATIENT)
Dept: FAMILY MEDICINE | Facility: CLINIC | Age: 30
End: 2025-05-22

## 2025-05-22 NOTE — TELEPHONE ENCOUNTER
Patient Quality Outreach    Patient is due for the following:   Diabetes -  A1C    Action(s) Taken:   Called patient and patient not available.     Type of outreach:    Phone, left message for patient/parent to call back.    Questions for provider review:    None         Navjot Muniz CMA  Chart routed to None.

## 2025-08-10 ENCOUNTER — HEALTH MAINTENANCE LETTER (OUTPATIENT)
Age: 30
End: 2025-08-10

## (undated) DEVICE — SYR EAR 3OZ BULB IRR STRL DISP BLU PVC 4173

## (undated) DEVICE — KIT DEFOGGING-FOG INHIBITOR FOG1001

## (undated) DEVICE — DRAPE SLEEVE 599

## (undated) DEVICE — PLATE GROUNDING ADULT W/CORD 9165L

## (undated) DEVICE — LUBRICANT SURG 2 OZ STRL FLIP TOP 2OZLUB

## (undated) DEVICE — SPONGE TONSIL 1IN MED STRL 7201

## (undated) DEVICE — TUBING SUCTION MEDI-VAC 1/4"X20' N620A - HE

## (undated) DEVICE — GLOVE BIOGEL PI SZ 7.5 40875

## (undated) DEVICE — ESU CORD BIPOLAR 12' E0512

## (undated) DEVICE — GLOVE SURG PI ULTRA TOUCH M SZ 7-1/2 LF

## (undated) DEVICE — SOL NACL 0.9% IRRIG 1000ML BOTTLE 2F7124

## (undated) DEVICE — SUCTION TIP YANKAUER W/O VENT K86

## (undated) DEVICE — SU CHROMIC 4-0 SH 27" G121H

## (undated) DEVICE — CUSTOM PACK MINOR SBA5BMNHEA

## (undated) DEVICE — SOL WATER IRRIG 1000ML BOTTLE 2F7114

## (undated) DEVICE — SOL NACL 0.9% INJ 500ML BAG 2B1323Q

## (undated) DEVICE — DRAPE U SPLIT 74X120" 29440

## (undated) RX ORDER — PROPOFOL 10 MG/ML
INJECTION, EMULSION INTRAVENOUS
Status: DISPENSED
Start: 2023-11-01

## (undated) RX ORDER — LIDOCAINE HYDROCHLORIDE AND EPINEPHRINE 10; 10 MG/ML; UG/ML
INJECTION, SOLUTION INFILTRATION; PERINEURAL
Status: DISPENSED
Start: 2023-11-01

## (undated) RX ORDER — FENTANYL CITRATE 50 UG/ML
INJECTION, SOLUTION INTRAMUSCULAR; INTRAVENOUS
Status: DISPENSED
Start: 2023-11-01